# Patient Record
(demographics unavailable — no encounter records)

---

## 2024-10-23 NOTE — ASSESSMENT
[FreeTextEntry1] : She understands risk of immunosuppression from steroids.  Do not get covid vaccine for 2 wk.  f/u prn

## 2024-10-23 NOTE — PROCEDURE
[de-identified] : indication: pain   Fluoroscopically Guided, Contrast Enhanced,  Left L4/5 and L5/S1 Facet Injections   After informed consent, she was placed prone on the fluoroscopy table. Skin over the area was prepped and draped in the usual sterile fashion before being anesthetized with 1% Lidocaine. Then using an oblique approach, a 5  in spinal needle was directed down to the L5/S1 facet joint. Needle placement was confirmed with AP fluoroscopic images. After negative aspiration for blood or cerebrospinal fluid, contrast was instilled under live fluoroscopy and an arthrogram was obtained. It showed good flow in the capsule without any vascular uptake. Then a steroid solution consisting of 20 mg of Kenalog and 1 ml of 1% Lidocaine was instilled. The same procedure was repeated at the    L4/5 facet joint.   She tolerated the procedure well and was discharged in good condition without any complications or complaints.  She  was given discharge instructions and asked to follow-up in clinic in two weeks.     Manufacture [employer] Omnipaque 180 NDC [id number] Exp 12/15/25 [id number]   Lidocaine Manufacture [employer] NDC 65265-626-03 Exp 9/25 LOT [id number]    Triamcinolone NDC [address]-3 Exp 11/25 Lot [id number] Manufacture [employer]

## 2024-10-28 NOTE — HEALTH RISK ASSESSMENT
[Good] : ~his/her~  mood as  good [Yes] : Yes [Monthly or less (1 pt)] : Monthly or less (1 point) [1 or 2 (0 pts)] : 1 or 2 (0 points) [One fall no injury in past year] : Patient reported one fall in the past year without injury [Never] : Never [Alone] : lives alone [Retired] : retired [Fully functional (bathing, dressing, toileting, transferring, walking, feeding)] : Fully functional (bathing, dressing, toileting, transferring, walking, feeding) [Fully functional (using the telephone, shopping, preparing meals, housekeeping, doing laundry, using] : Fully functional and needs no help or supervision to perform IADLs (using the telephone, shopping, preparing meals, housekeeping, doing laundry, using transportation, managing medications and managing finances) [0] : 1) Little interest or pleasure doing things: Not at all (0) [Patient reported mammogram was normal] : Patient reported mammogram was normal [de-identified] : walk [de-identified] : regular [de-identified] : fell in shower, no head strike ,  [MammogramDate] : 05/24

## 2024-10-28 NOTE — HISTORY OF PRESENT ILLNESS
[FreeTextEntry1] : annual exam [de-identified] : Obesity: She reports prednisone has caused her to gain weight. She reports being a petite at 125lb. She became depressed and gained to 150lb. After prednisone she is now 201lb Wants to lose weight and requesting medication  HLD: Simvastatin 40 mg q night   HTN Valsartan 40 mg 3x a day  Metoprolol  2x a day    Vertigo : Takes Meclezine 25 mg at bedtime  Hx Peptic Ulcer/ Chronic GERD:  Pantoprazole 40 mg daily  Aortic stenosis s/p TAVR: Aspirin 81 mg   5/31/24 was in SVT in the 180 bpm range. She was treated with Adenosine 6 mg and 12 mg with conversion to sinus rhythm (performed at her home).  She does report palpitation and SOB on exertion. Cardio discussed ablation as a treatment option. Patient requesting Dr. Erazo's advice.  Sciatica, low back pain:  Epidural every few year   Thyroidectomy: Post surgery tissue in the neck  Admission notable for low TSH 0.211 and high Free T4 2.030. Levothyroxine was reduced to 137 mg from 150 mg. Levothyroxine 137 mg   Bone health:  Alendronic acid 70 mg   Supplements: Folate Vit B12   Labs: Check ESR , TSH   CKD: Sees Adela on thursday   PMR : Prednisone now at 3 mg , no longer having red dots in vision Rheumatology appointment in december HCM - Covid vaccine: 2 weeks ago - Influenza vaccine: 2 weeks ago  -Pneumonia vaccine: UTD - Ophthalmology:  No small red dot in vision today. has to make an appointment  - Dentist: 3 teethes removed ( 1 bridge tooth ) UTD - Derm: Never went because was too busy with other medical appointments. Cheeks are red, fluctuates in intensity. - Diet: regular - Exercise: walks

## 2024-10-28 NOTE — ASSESSMENT
[FreeTextEntry1] : Obesity Management Discuss options for weight management medications. Prednisone will need to be tapered to see weight loss  Aortic Stenosis and Cardiac Management  Discuss the possibility for treatment options (e.g., ablation).  Thyroid Management Labs TSH and Free T4 levels done to check if they have decreased since levothyroxine on 137 mg    PMR: Rheumatology Appointment in December: Follow up on PMR management and current prednisone dosage. ESR lab done  Schedule an ophthalmology appointment to monitor eye health. Follow up with dermatology regarding fluctuating redness on the cheeks and prioritize this visit.

## 2024-10-28 NOTE — END OF VISIT
[FreeTextEntry3] : I personally performed the service described in the documentation, reviewed the documentation recorded by NP student Amparo Hall in my presence and it accurately and completely records my words and actions.

## 2024-10-28 NOTE — REVIEW OF SYSTEMS
[Hearing Loss] : hearing loss [Palpitations] : palpitations [Shortness Of Breath] : shortness of breath [Cough] : cough [Heartburn] : heartburn [Frequency] : frequency [Back Pain] : back pain [Fatigue] : no fatigue [Recent Change In Weight] : ~T no recent weight change [Pain] : no pain [Redness] : no redness [Dryness] : no dryness  [Vision Problems] : no vision problems [Nosebleed] : no nosebleeds [Hoarseness] : no hoarseness [Chest Pain] : no chest pain [Lower Ext Edema] : no lower extremity edema [Wheezing] : no wheezing [Dyspnea on Exertion] : no dyspnea on exertion [Abdominal Pain] : no abdominal pain [Vomiting] : no vomiting [Joint Pain] : no joint pain [Joint Swelling] : no joint swelling [Muscle Pain] : no muscle pain [Mole Changes] : no mole changes [Nail Changes] : no nail changes [Skin Rash] : no skin rash [Headache] : no headache [Dizziness] : no dizziness [Fainting] : no fainting [Confusion] : no confusion [Memory Loss] : no memory loss [Unsteady Walking] : no ataxia [FreeTextEntry3] : no red dots in vision anymore [FreeTextEntry4] : slight hearing loss [FreeTextEntry6] : SOB on exertion

## 2024-10-28 NOTE — PHYSICAL EXAM
[No Acute Distress] : no acute distress [Well Nourished] : well nourished [Well Developed] : well developed [Normal Sclera/Conjunctiva] : normal sclera/conjunctiva [EOMI] : extraocular movements intact [Normal Outer Ear/Nose] : the outer ears and nose were normal in appearance [Normal Oropharynx] : the oropharynx was normal [No JVD] : no jugular venous distention [No Lymphadenopathy] : no lymphadenopathy [Supple] : supple [No Respiratory Distress] : no respiratory distress  [No Accessory Muscle Use] : no accessory muscle use [Clear to Auscultation] : lungs were clear to auscultation bilaterally [Normal Rate] : normal rate  [Regular Rhythm] : with a regular rhythm [Normal S1, S2] : normal S1 and S2 [No Carotid Bruits] : no carotid bruits [Pedal Pulses Present] : the pedal pulses are present [No Edema] : there was no peripheral edema [Soft] : abdomen soft [Non Tender] : non-tender [Non-distended] : non-distended [No Masses] : no abdominal mass palpated [No HSM] : no HSM [Normal Bowel Sounds] : normal bowel sounds [Normal Posterior Cervical Nodes] : no posterior cervical lymphadenopathy [Normal Anterior Cervical Nodes] : no anterior cervical lymphadenopathy [No CVA Tenderness] : no CVA  tenderness [No Spinal Tenderness] : no spinal tenderness [No Joint Swelling] : no joint swelling [No Rash] : no rash [Coordination Grossly Intact] : coordination grossly intact [No Focal Deficits] : no focal deficits [Normal Gait] : normal gait [Normal Affect] : the affect was normal [Normal Insight/Judgement] : insight and judgment were intact [de-identified] : thyroidectomy

## 2024-10-31 NOTE — END OF VISIT
[TextEntry] : All medical record entries have been made by the scribe, JENNIFER CANNON, at Dr. Anil Carty direction and personally dictated by me on 10/31/24. I have received the chart and agree that the record accurately reflects my personal performance of the history, physical exam, assessment, and plan. I have also personally directed, reviewed and agreed with the chart.

## 2024-10-31 NOTE — PHYSICAL EXAM
[General Appearance - Alert] : alert [General Appearance - In No Acute Distress] : in no acute distress [Auscultation Breath Sounds / Voice Sounds] : lungs were clear to auscultation bilaterally [Heart Rate And Rhythm] : heart rate was normal and rhythm regular [Heart Sounds] : normal S1 and S2 [Heart Sounds Gallop] : no gallops [Murmurs] : no murmurs [Heart Sounds Pericardial Friction Rub] : no pericardial rub [Full Pulse] : the pedal pulses are present [Edema] : there was no peripheral edema [Bowel Sounds] : normal bowel sounds [Abdomen Soft] : soft [Abdomen Tenderness] : non-tender [] : no hepato-splenomegaly [Abdomen Mass (___ Cm)] : no abdominal mass palpated [Abnormal Walk] : normal gait [Nail Clubbing] : no clubbing  or cyanosis of the fingernails [Musculoskeletal - Swelling] : no joint swelling seen [Motor Tone] : muscle strength and tone were normal [Oriented To Time, Place, And Person] : oriented to person, place, and time [Impaired Insight] : insight and judgment were intact [Affect] : the affect was normal [FreeTextEntry1] : + AMBULATES WITH CANE

## 2024-10-31 NOTE — PLAN
[TextEntry] : - Encouraged to see Dr. Herzog for routine holter monitor placement q 3mos. EKG completed in clinic.   BP/HR taken in different positions (sitting standing and lying down) and d/w pt Self-monitoring at home advised i.e. BP, FS, etc. Medications updated Diet/healthy lifestyle counselling New labs ordered. Follow up in 2-3 months.

## 2024-10-31 NOTE — HISTORY OF PRESENT ILLNESS
[FreeTextEntry1] : 91 y/o F with PMHX aortic stenosis, bilateral impacted cerumen, BPV, chronic back pain, GERD, contusion of knee, ODE, etc. She is here for a follow up visit. She was last here 08/28/24.  Patient reports feeling well generally.  She is still adherent to her medicinal regimen. Denies changes to medicine. Dr. Hall follows her for her bone density - she takes alendronate 70mg weekly.  Patient reports she is current with her medical obligations.  Admits hx of bone spur on her shoulder and later woke up paralyzed in bed in correlation to polymyalgia rheumatica.  Labs were reviewed with patient.   Patient experienced SVT in May. She is unsure if she would like to proceed with medical therapy and catheter-based ablation as suggested by Dr. Reyna.  She reports experiencing some palpitations occasionally but is unsure why.  Patient's BP accelerates intermittently.

## 2024-11-22 NOTE — PROCEDURE
[de-identified] : indication: pain   Fluoroscopically Guided Left L3, L4, L5 Medial Branch Block   After informed consent, the patient was placed prone on the fluoroscopy table. Skin over the area was prepped and draped in the usual sterile fashion before being anesthetized with 1% Lidocaine. Then using an oblique approach, a 5 in spinal needle was directed down to the junction of the LEFT   lateral superior articular process and the sacral ala. Needle placement was confirmed with AP fluoroscopic images. After negative aspiration for blood or cerebrospinal fluid, 1 ml of  2% Lidocaine was instilled.  The same procedure was repeated by placing the needle at the junction of the LEFT L4 SAP and L5 SAP and their corresponding transverse processes.  Following the procedure, the needles were removed, the skin was cleaned, and sterile dressings were applied.  She tolerated the procedure well and was discharged in good condition without any complications or complaints.  She  felt immediate pain relief.  She  was given discharge instructions and asked to follow-up in clinic in 1 week.   Exp 6/2025 Manufacture: [employer] NDC 93248-208-02 LOT [id number]

## 2024-12-05 NOTE — ASSESSMENT
[FreeTextEntry1] : 91-year-old woman returns for follow up of PMR. Patient with visit longstanding polymyalgia rheumatica previously  doing well on 5 mg a day of prednisone and had tapered to 2 and 1 mg on alternating days. However, given visual symptoms, periodically seeing small red dots with no other visual changes on exam and no accompanying headaches or migraines, increased prednisone to 10 mg qday as per ophthalmology, now continues to taper dose, currently taking 2 mg qday for the past two days.  Of note, patient feels some improvement in the frequency of eye symptoms, episodes less frequent than previous, recently evaluated by neuro ophthalmologist as well. Feels may be triggered by stress as well. Will continue to taper prednisone as tolerated, by 1 mg every 4 weeks as tolerated. Patient continues alendronate 70 mg/week as per endocrinology, DEXA with osteopenia, if able to taper off prednisone, may be able to discontinue alendronate as well.  Patient recently started pulmonary rehab, completed breathing tests, 6 minute walk test, and EKG on Monday, patient will begin pulmonary rehab, twice per week for 4 weeks.  Patient completed blood work with PCP in October, ESR and CRP good. Will continue to taper prednisone and will follow up in 3 months or sooner as needed.

## 2024-12-05 NOTE — HISTORY OF PRESENT ILLNESS
[FreeTextEntry1] : December 5, 2024 Patient returns for a follow up visit of PMR Patient decreased prednisone to 2mg two days ago, reports some aching symptoms in the shoulders but not severe pain and not associated with morning stiffness Followed up with Dr. Carty in August and October, reviewed labs with patient.   Underwent blood work with PCP in October, results reviewed during today's visit, Recent A1c 6.1% Awaiting refills for levothyroxine 137 mcg Patient will be starting pulmonary rehab at [place], had recent breathing test, treadmill breathing test, and EKG on Monday Reports return of red spots in vision intermittently Reports facial rash since taking prednisone but less  Continues walking daily  No headaches, no jaw claudication  September 9, 2024 Patient returns for a follow up visit of PMR Patient overall feeling well. 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 Patient continues prednisone 6 mg daily, did not continue to decrease prednisone as worried about other medical conditions. Reviewed recent labs with patient, patient concerned about weight as well, will continue to decrease prednisone 1 mg every 4 weeks as tolerated. Patient experiences intermittent pain in the hips but does not feel related to PMR symptoms feels more degenerative Denies headaches  The patient visited two neurologists and optometrist re: visual snow syndrome  Patient was seen by pulmonologist, had 6-minute walk test and PFTs Patient followed up with Dr. Carty The patient is walking around her apartment and walking outside for exercise Patient continues alendronate 70 mg weekly reviewed bone density December 2023 with osteopenia, if able to discontinue prednisone would discontinue alendronate as well. The patient will be starting pulmonary rehab  June 5th, 2024 Pt arrives for follow up Since last visit, patient developed tachycardia (180BPM) May 31 2024, was taken to the ER at [place] [facility]. had echo and treated with medications to slow the heart rate Patient feeling better, since that time, was evaluated by endocrinologist, and cardiologist, had medications adjusted.Has follow up with cardiologist tomorrow Pt given magnesium, land decreased levothyroxine dose as well. Takes baby aspirin daily.  In terms of eye symptoms, patient feels as though less symptoms, less episodes of the red dots Pt is on 8mg of prednisolone for two weeks, pt advised to do weekly taper. Will continue to reduce prednisolone by 1mg every 3 weeks.  March 4, 2024 Patient returns for follow up of PMR Patient reports pain in hip for the last week, starting with right and then left, most recently in right hip this morning, will follow with Dr. Mireles Reports redness in cheeks for the last 6 months Reports tiny dots in vision periodically, comes and goes, two weeks ago had it from 3:30pm to night time, started a year and half to two years ago Patient had ocular migraines previously, spots in vision not accompanied by migraines or headaches Patient feels vision has changed, however on exam vision remains the same No jaw pain while chewing Patient noted stye in right eye two days ago, started antibiotic drops tid last night Patient is currently taking alendronate Started on Prednisone 10 mg as per Dr. Barbosa, felt benefit for a few days in eyes symptoms with less frequency of events, currently taking 5 mg qday, will continue for now Patient was evaluated by Dr. Herzog in cardiology, was taking valsartan 40 mg bid, switched to every 8 hours Previously took prednisone 5 or 10 mg years ago after blood pressure was elevated and went to urgent care When initially diagnosed with PMR, patient had pain in foot and shoulder, noted spurs at that time, reports two incidents where patient could not move at all prior to diagnosis Patient was evaluated by nuclear optometry Following with Dr. Barbosa on March 13, and then PCP March 14th Patient concerned about sugars, A1c 5.9 in December Family history of MI (father), Cancer (sisters), hernia (mother), PMR (sister), ROV (cousin) Many years ago had sciatica Reviewed temporal artery ultrasound with patient Evaluated by Dr. Tommie Chi in neuro-ophthalmology Discussed monitoring blood pressure at home, especially when experiences visual symptoms perhaps related to low blood pressure or fluctuations  January 26, 2024 Patient returns for follow up of PMR Patient is feeling generally well Patient reports increasing pain in left knee secondary to DJD (s/p right knee replacement previously) Reports many red "pindrops" in vision periodically, sometimes lasting minutes, also with nuclear migraines, evaluated by Dr. Tommie Chi in neuro-ophthalmology Also reports increased redness in cheeks Blood pressure low in office today (98/62), patient feels may have accidentally taken 2 doses of blood Valsartan 40 mg today, feeling well, encouraged increased hydration and will repeat blood rpessure tonight prior to metoprolol dose and hold if remains lower than usual. Patient is currently taking prednisone 2mg and 1 mg on alternating days since last visit, will taper to 1 mg qday for one month, then decrease to 1 mg on alternating days with plans to discontinue Patient was evaluated by pain management, had epidural with some benefit, received gel injection in hip and reports increase in pain History of right TKA No headaches, no jaw pain, Labs reviewed  September 27, 2023 Patient returns for follow up of PMR Patient currently taking prednisone 2 mg daily, continued tapering since last visit from 4 mg daily Patient overall feeling well, has some slight discomfort in the upper shoulders but otherwise feels well No headaches, no jaw pain, no vision changes Patient continues alendronate 70 mg weekly, has endocrine follow-up in December 2023. Patient had recent facet injections September 11, has follow-up with [facility]NR as well Feels benefit in terms of low back pain from the injections.  May 9, 2023 Patient with history of PMR Diagnosed in September 2021 Started on prednisone 20 mg qday with good response in terms of joint pain and stiffness Patient describes initial symptoms as feeling paralyzed and unable to get out of bed. Initially started in the right shoulder, then spread to left shoulder and bilateral hips Patient has been tapering prednisone over the past 18 months, most recently tapered to 1 mg qday, but repeat blood test with elevated ESR and CRP. Patient is currently taking prednisone 5 mg qday, repeat ESR and CRP in the normal range. Patient is concerned about side effects of prednisone. Patient will be traveling to [place] in the summer and concerned about possible pain. Discussed slow taper, patient currently asymptomatic. Previous taper every 2 weeks of 1 mg, with increasing inflammatory markers when prednisone decreased to 1 mg. Discussed tapering prednisone 1 mg every 4 weeks, will decrease prednisone to 4 mg. Patient continues alendronate for osteoporosis. Reviewed method of administration with patient, no side effects noted. Patient with labile blood pressure, today well controlled. metoprolol 100 bid valsartan 40 bid

## 2024-12-05 NOTE — PHYSICAL EXAM
[General Appearance - Alert] : alert [General Appearance - In No Acute Distress] : in no acute distress [Oriented To Time, Place, And Person] : oriented to person, place, and time [Impaired Insight] : insight and judgment were intact [Affect] : the affect was normal [Abnormal Walk] : normal gait [Nail Clubbing] : no clubbing  or cyanosis of the fingernails [Musculoskeletal - Swelling] : no joint swelling seen [Motor Tone] : muscle strength and tone were normal [General Appearance - Well Nourished] : well nourished [General Appearance - Well Developed] : well developed [General Appearance - Well-Appearing] : healthy appearing [Sclera] : the sclera and conjunctiva were normal [Examination Of The Oral Cavity] : the lips and gums were normal [Respiration, Rhythm And Depth] : normal respiratory rhythm and effort [Exaggerated Use Of Accessory Muscles For Inspiration] : no accessory muscle use [Edema] : there was no peripheral edema [No Spinal Tenderness] : no spinal tenderness [] : no rash [FreeTextEntry1] : No active synovitis of the upper and lower extremities bilaterally. Mild decrease in ROm of the right knee )s/p TKR 3/2019

## 2024-12-05 NOTE — REVIEW OF SYSTEMS
[Negative] : Heme/Lymph [Eyesight Problems] : eyesight problems [Arthralgias] : arthralgias [Joint Swelling] : no joint swelling [Joint Stiffness] : no joint stiffness [FreeTextEntry3] : intermittent red spots, less frequent

## 2024-12-05 NOTE — HISTORY OF PRESENT ILLNESS
[FreeTextEntry1] : December 5, 2024 Patient returns for a follow up visit of PMR Patient decreased prednisone to 2mg two days ago, reports some aching symptoms in the shoulders but not severe pain and not associated with morning stiffness Followed up with Dr. Carty in August and October, reviewed labs with patient.   Underwent blood work with PCP in October, results reviewed during today's visit, Recent A1c 6.1% Awaiting refills for levothyroxine 137 mcg Patient will be starting pulmonary rehab at [place], had recent breathing test, treadmill breathing test, and EKG on Monday Reports return of red spots in vision intermittently Reports facial rash since taking prednisone but less  Continues walking daily  No headaches, no jaw claudication  September 9, 2024 Patient returns for a follow up visit of PMR Patient overall feeling well. 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 Patient continues prednisone 6 mg daily, did not continue to decrease prednisone as worried about other medical conditions. Reviewed recent labs with patient, patient concerned about weight as well, will continue to decrease prednisone 1 mg every 4 weeks as tolerated. Patient experiences intermittent pain in the hips but does not feel related to PMR symptoms feels more degenerative Denies headaches  The patient visited two neurologists and optometrist re: visual snow syndrome  Patient was seen by pulmonologist, had 6-minute walk test and PFTs Patient followed up with Dr. Carty The patient is walking around her apartment and walking outside for exercise Patient continues alendronate 70 mg weekly reviewed bone density December 2023 with osteopenia, if able to discontinue prednisone would discontinue alendronate as well. The patient will be starting pulmonary rehab  June 5th, 2024 Pt arrives for follow up Since last visit, patient developed tachycardia (180BPM) May 31 2024, was taken to the ER at [facility]. had echo and treated with medications to slow the heart rate Patient feeling better, since that time, was evaluated by endocrinologist, and cardiologist, had medications adjusted.Has follow up with cardiologist tomorrow Pt given magnesium, land decreased levothyroxine dose as well. Takes baby aspirin daily.  In terms of eye symptoms, patient feels as though less symptoms, less episodes of the red dots Pt is on 8mg of prednisolone for two weeks, pt advised to do weekly taper. Will continue to reduce prednisolone by 1mg every 3 weeks.  March 4, 2024 Patient returns for follow up of PMR Patient reports pain in hip for the last week, starting with right and then left, most recently in right hip this morning, will follow with Dr. Mireles Reports redness in cheeks for the last 6 months Reports tiny dots in vision periodically, comes and goes, two weeks ago had it from 3:30pm to night time, started a year and half to two years ago Patient had ocular migraines previously, spots in vision not accompanied by migraines or headaches Patient feels vision has changed, however on exam vision remains the same No jaw pain while chewing Patient noted stye in right eye two days ago, started antibiotic drops tid last night Patient is currently taking alendronate Started on Prednisone 10 mg as per Dr. Barbosa, felt benefit for a few days in eyes symptoms with less frequency of events, currently taking 5 mg qday, will continue for now Patient was evaluated by Dr. Herzog in cardiology, was taking valsartan 40 mg bid, switched to every 8 hours Previously took prednisone 5 or 10 mg years ago after blood pressure was elevated and went to urgent care When initially diagnosed with PMR, patient had pain in foot and shoulder, noted spurs at that time, reports two incidents where patient could not move at all prior to diagnosis Patient was evaluated by nuclear optometry Following with Dr. Barbosa on March 13, and then PCP March 14th Patient concerned about sugars, A1c 5.9 in December Family history of MI (father), Cancer (sisters), hernia (mother), PMR (sister), ROV (cousin) Many years ago had sciatica Reviewed temporal artery ultrasound with patient Evaluated by Dr. Tommie Chi in neuro-ophthalmology Discussed monitoring blood pressure at home, especially when experiences visual symptoms perhaps related to low blood pressure or fluctuations  January 26, 2024 Patient returns for follow up of PMR Patient is feeling generally well Patient reports increasing pain in left knee secondary to DJD (s/p right knee replacement previously) Reports many red "pindrops" in vision periodically, sometimes lasting minutes, also with nuclear migraines, evaluated by Dr. Tommie Chi in neuro-ophthalmology Also reports increased redness in cheeks Blood pressure low in office today (98/62), patient feels may have accidentally taken 2 doses of blood Valsartan 40 mg today, feeling well, encouraged increased hydration and will repeat blood rpessure tonight prior to metoprolol dose and hold if remains lower than usual. Patient is currently taking prednisone 2mg and 1 mg on alternating days since last visit, will taper to 1 mg qday for one month, then decrease to 1 mg on alternating days with plans to discontinue Patient was evaluated by pain management, had epidural with some benefit, received gel injection in hip and reports increase in pain History of right TKA No headaches, no jaw pain, Labs reviewed  September 27, 2023 Patient returns for follow up of PMR Patient currently taking prednisone 2 mg daily, continued tapering since last visit from 4 mg daily Patient overall feeling well, has some slight discomfort in the upper shoulders but otherwise feels well No headaches, no jaw pain, no vision changes Patient continues alendronate 70 mg weekly, has endocrine follow-up in December 2023. Patient had recent facet injections September 11, has follow-up with PMNR as well Feels benefit in terms of low back pain from the injections.  May 9, 2023 Patient with history of PMR Diagnosed in September 2021 Started on prednisone 20 mg qday with good response in terms of joint pain and stiffness Patient describes initial symptoms as feeling paralyzed and unable to get out of bed. Initially started in the right shoulder, then spread to left shoulder and bilateral hips Patient has been tapering prednisone over the past 18 months, most recently tapered to 1 mg qday, but repeat blood test with elevated ESR and CRP. Patient is currently taking prednisone 5 mg qday, repeat ESR and CRP in the normal range. Patient is concerned about side effects of prednisone. Patient will be traveling to [place] in the summer and concerned about possible pain. Discussed slow taper, patient currently asymptomatic. Previous taper every 2 weeks of 1 mg, with increasing inflammatory markers when prednisone decreased to 1 mg. Discussed tapering prednisone 1 mg every 4 weeks, will decrease prednisone to 4 mg. Patient continues alendronate for osteoporosis. Reviewed method of administration with patient, no side effects noted. Patient with labile blood pressure, today well controlled. metoprolol 100 bid valsartan 40 bid

## 2024-12-05 NOTE — ADDENDUM
[FreeTextEntry1] :  I, Heather Casanova, acted solely as a scribe for Dr. Rehana Hall, direction and personally dictated by me on 12/05/2024. I have reviewed the chart and agree that the record accurately reflects my personal performance of the history, physical exam, assessment, and plan. I have also personally directed, reviewed, and agreed with the chart.

## 2024-12-11 NOTE — ASSESSMENT
[FreeTextEntry1] : Labs: WBC 9.68 (), Hgb 13.4, Plts 319 Na 142, K 4.5, Cl 103, HCO3 23, BUN/creat 22/1.10, glucose 101 Tbili 0.4, AST/ALT 19/11, Alk phos 56, TP/Albumin 7.4/4.8  ProBNP:  7/2024:   1035 5/2024:   998 4/2023:   539 10/2020: 337 8/2017:   208 8/2017:   182 2016:      553 2015:      87  HIV negative  RF negative CCP negative Scl-70: negative Centromere: negative KAIDEN 1:80 Maki negative RNP negative  V/Q (6/2024): Impression:  Low probability for pulmonary embolism.  Chest CT (10/2023): IMPRESSION: 1. Since October 23, 2021, unchanged multiple subsolid pulmonary nodules. No new suspicious finding.  Chest CT (12/2024): IMPRESSION: No significant interval change when compared to examination dated 10/23/2021. Persistent innumerable subcentimeter and groundglass micronodules the largest measuring up to 6 and 8 mm within the right upper lobe as described above. There may be a slight centrilobular predilection of some of the nodules and abbreviated differential includes subacute hypersensitivity and respiratory bronchiolitis in the appropriate clinical setting. As per Fleischner society 2017 guidelines, continued interval surveillance up to 5 years from the initial study to confirm stability.  TTE (1/2024): CONCLUSIONS:  1. Normal left and right ventricular size and systolic function.  2. Moderately dilated left atrium.  3. TAVR valve is seen in the aortic position.  4. Mild aortic regurgitation.  5. Moderate mitral regurgitation.  6. Moderate mitral stenosis.  7. Moderate-to-severe tricuspid regurgitation.  8. Pulmonary hypertension present, pulmonary artery systolic pressure is 82 mmHg.  9. No pericardial effusion. 10. Compared to the previous TTE performed on 10/26/2020, Aortic valve gradients are increased; aortic, mitral, tricuspid regurgitation, and pulmonary hypertension are new.  TTE (7/2024): CONCLUSIONS: 1. Normal left ventricular cavity size and wall thickness. 2. Hyperdynamic left ventricular systolic function, LV EF 75%. 3. Normal right ventricular cavity size, wall thickness, and systolic function. 4. Mildly dilated left atrium. 5. s/p 23 mm Yonatan S3 TAVR, PV 2.3 m/s, MG 11 mmHg, LVOT/AV VTI 0.44, SVI 13.4 ml/m2. 6. Mild aortic regurgitation, transvalvular. 7. Moderate mitral stenosis, MG 6.0 mmHg at 77 bpm. 8. Mild-to-moderate mitral regurgitation. 9. Moderate tricuspid regurgitation. 10. No pericardial effusion seen. 11. Pulmonary hypertension, PASP 44 mmHg.  PFTs              2022 2024 FEV1/FVC    78%              82% FEV1            1.52, 104%   1.57, 121% FVC              1.96, 98%     1.91, 107% TLC                3.46, 75%    3.26, 73% RV                 1.50, 66%     1.34, 60% DLCO            10.63, 61%    11.66, 71%  6MWT 8/2024: 204 meters (669 feet); jose SpO2 95%  A/P: 90 yo F h/o AS s/p TAVR (2017), moderate mitral stenosis and regurgitation, GERD with LPR, PMR on Prednisone, pulmonary venous hypertension, HFpEF, and mild bronchiectasis returns to clinic.  Ms. Durham is doing well; she is benefitting from pulmonary rehabilitation. She is controlling her volume status.  I suspect her RVSP is related to WHO Group III pulmonary (venous) hypertension, which is stable. She decided not to pursue the sleep study now.  Her chest CT shows stability of her multi-focal GGOs from 0831-4960; I would like to see her back in 6 months to re-evaluate her symptoms and consider repeat imaging in 2025. If she remains well, we would ideally follow these lung nodules for 5 years, if she remains eligible for evaluation and treatment.  1. Pulmonary hypertension: suspect WHO Group II 2. AS s/p TAVR 3. Moderate MS and MR 4. Multiple ground glass pulmonary nodules 5. Exertional dyspnea 6. Mild bronchiectasis  -appreciate Cardiology evaluation -continue pulmonary rehabilitation -discussed BP control and limiting daily sodium and fluid intake (<2 grams/day; <2L/day) -she did not pursue her home sleep study -Vaccinations: up-to-date with Dr. Clark -Future considerations: repeat chest CT in 2025 -follow-up with me in 6 months

## 2024-12-11 NOTE — HISTORY OF PRESENT ILLNESS
[Never] : never [TextBox_4] : I saw Ms. Concetta Durham in follow-up today. In review, Ms. Durham is a 92 yo  F h/o AS (s/p TAVR 2017), moderate mitral regurgitation and stenosis, pulmonary hypertension (likely WHO Group II), GERD with LPR, PMR (2 years prior), dCHF, and mild bronchiectasis. Ms. Durham was previously cared for by Dr. Willoughby with last visit in 8/2022.   12/2023: Ms. Durham reports feeling well. She reports dyspnea with heavy activity. She ambulates with a cane for support. She performs her own ADLs and iADLs at home. She has been less active over the last 1-2 years due to concerns of catching COVID. She has been slowly weaning down on her Prednisone; she is currently on 1 mg/day.  7/2024: Since our last visit, Ms. Durham was hospitalized for SVT in in 6/2024. Her TTE showed new pulmonary hypertension. She recently saw electrophysiology and was offered ablation for SVT, which she is considering. Today, Ms. Durham reports feeling "ok." She has been started on an ARB since our last visit. She denies syncope, chest pain, paroxysmal nocturnal dyspnea, or edema. She denies coughing. She does not take naps during the day. Her Prednisone has continued to be weaned for her PMR, currently 6 mg/day.  8/2024: Ms. Durham has continued to have some exertional dyspnea with heavy activity. She is living independently and without limitations, though the same activities require more exertion. She denies leg edema, cough, or changes in her weight.  12/2024: Ms. Durham started pulmonary rehabilitation at [facility] recently, which is helping her. She did not pursue the sleep study since she is not sure that she needs it. She denies lower extremity edema.  PMH: AS s/p TAVR 2017 Moderate mitral regurgitation and stenosis Breast hyperplasia GERD with LPR PMR (2 years) HTN HLD Hypothyroidism HFpEF Mild bronchiectasis  FH: Breast cancer [ESS] : 0

## 2024-12-26 NOTE — HISTORY OF PRESENT ILLNESS
[FreeTextEntry1] : NPV-BURN ON ABDOMEN [de-identified] : Concetta Durham 92 y/o F presents for burn on abdomen spilled coffee on her abdomen 2 weeks ago went to urgent care  was given silvadene area is itchy also with red bumps on cheeks  Personal history of skin cancer: NO Family history of skin cancer: NO History of blistering sunburns: NO History of tanning bed use: NO Uses sunscreen regularly: NO

## 2024-12-26 NOTE — ASSESSMENT
[FreeTextEntry1] : #Skin infection #PIH 2/2 burn start mupirocin ointment to erosion daily until resolved start cicaplast balm daily  #Rosacea -chronic, flaring -I have discussed the chronic nature and course of this condition -start metrocream bid to aa  -emollients, sun protection  RTC 1 month

## 2024-12-26 NOTE — PHYSICAL EXAM
[Alert] : alert [Oriented x 3] : ~L oriented x 3 [FreeTextEntry3] : Focused exam: -pink hyperpigmented patch with small superficial erosion on abdomen few pink papules on cheeks

## 2025-01-14 NOTE — REASON FOR VISIT
[Follow - Up] : a follow-up visit [Hypothyroidism] : hypothyroidism [Osteoporosis] : osteoporosis [Weight Management/Obesity] : weight management/obesity [Other___] : [unfilled]

## 2025-01-14 NOTE — HISTORY OF PRESENT ILLNESS
[FreeTextEntry1] : 88 y.o. female who was found to have thyroid nodules during a surveillance for pulmonary nodules on a CT scan of the chest in Jan 2021. 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 She underwent total thyroide[place] [facility] [place]. No cancer was found. She is now on 0.15 mg T4 daily. She is also known to have osteopenia  - bone density on 11/1/21; on alendronate tx. Of note, the patient is on prednisone tx for PMR. 4/25/22. The patient is dong well. She continues on thyroid hormone supplementation (0.125 mg T4 daily) and on alendronate. Bone density 10/22/21 is c/w osteopenia. She has gained 5 lb. 3/13/23. The patient's condition is stable - she has no new complaints. She is currently on 0.125 mg T4 daily (she used to be on 0.15 mg T4 daily). TSH on 3/9/23 was 10.3. HbA1C on the same date was 6.1%. 12/11/23. The patient is doing well - she has no new complaints. She continues on 0.15 mg T4 daily. Thyroid u/sound 7/12/23 - subcentimeter nodule, stable. Last bone density - 12/5/23 - stable. She continues on Alendronate tx. 1/14/25. The patient is doing well although she has gained 6 lb. She is still on 1 mg prednisone daily for PMR. She had an episode of arrhythmia in May and her dose of T4 was reduced to 0.137 mg from 0.15 mg. She continues on alendronate; bone density 12/5/23 - osteoporosis.

## 2025-01-14 NOTE — ASSESSMENT
[FreeTextEntry1] : Hypothyroidism/ S/P total thyroidectomy (no cancer). Osteopenia. PMR Obesity   Lab. tests today. Repeat bone density. Medications refilled. F/U - 3 months.

## 2025-01-28 NOTE — PHYSICAL EXAM
[Alert] : alert [Oriented x 3] : ~L oriented x 3 [FreeTextEntry3] : Focused exam: well healed scar on abdomen telangiectasias on cheeks

## 2025-01-28 NOTE — HISTORY OF PRESENT ILLNESS
[FreeTextEntry1] : RPV-BURN ON ABDOMEN [de-identified] : Concetta Durham 90 y/o F presents follow up for burn on abdomen LV:12/26/2024 -improvements from LV rosacea - using metrocream  previous hx: spilled coffee on her abdomen 2 weeks ago went to urgent care  was given silvadene area is itchy also with red bumps on cheeks  Personal history of skin cancer: NO Family history of skin cancer: NO History of blistering sunburns: NO History of tanning bed use: NO Uses sunscreen regularly: NO

## 2025-01-28 NOTE — ASSESSMENT
[FreeTextEntry1] : #Skin infection- resolved #PIH 2/2 burn stop mupirocin ointment to erosion daily until resolved can continue cicaplast balm daily  #Rosacea -chronic, stable -I have discussed the chronic nature and course of this condition -continue metrocream bid to aa  -emollients, sun protection  RTC PRN

## 2025-01-30 NOTE — PHYSICAL EXAM
[General Appearance - Well Developed] : well developed [Normal Appearance] : normal appearance [Well Groomed] : well groomed [General Appearance - Well Nourished] : well nourished [No Deformities] : no deformities [General Appearance - In No Acute Distress] : no acute distress [Normal Conjunctiva] : the conjunctiva exhibited no abnormalities [Eyelids - No Xanthelasma] : the eyelids demonstrated no xanthelasmas [Respiration, Rhythm And Depth] : normal respiratory rhythm and effort [Exaggerated Use Of Accessory Muscles For Inspiration] : no accessory muscle use [Auscultation Breath Sounds / Voice Sounds] : lungs were clear to auscultation bilaterally [Heart Rate And Rhythm] : heart rate and rhythm were normal [Heart Sounds] : normal S1 and S2 [Arterial Pulses Normal] : the arterial pulses were normal [Edema] : no peripheral edema present [Abdomen Soft] : soft [Abdomen Tenderness] : non-tender [Abdomen Mass (___ Cm)] : no abdominal mass palpated [Abnormal Walk] : normal gait [Gait - Sufficient For Exercise Testing] : the gait was sufficient for exercise testing [Nail Clubbing] : no clubbing of the fingernails [Cyanosis, Localized] : no localized cyanosis [Petechial Hemorrhages (___cm)] : no petechial hemorrhages [Skin Color & Pigmentation] : normal skin color and pigmentation [] : no rash [No Venous Stasis] : no venous stasis [Skin Lesions] : no skin lesions [No Skin Ulcers] : no skin ulcer [No Xanthoma] : no  xanthoma was observed [Oriented To Time, Place, And Person] : oriented to person, place, and time [Affect] : the affect was normal [Mood] : the mood was normal [FreeTextEntry1] : extremely anxious

## 2025-01-30 NOTE — ASSESSMENT
[FreeTextEntry1] : Pul HTN -likely multifactorial. may be d/t MR/MS, however there is a dramatic increase in PAP that has not aligned with valve fx.   referred to pulm   D-dimer 296. PAP down to 44 mm July 2024.  Pt reports occas wheezing, given Flovent.  SVT -will get 2 wk zio today. cont metoprolol   Frequent SVT while on Toprol 200/day  HTN: Reports elevated readings at home SBP ranging 160-190. BP slightly above goal 135-145/ 80 mmHg. - On Valsartan 80mg BID. Given IHSS pathology (stated below), will increase beta blocker to 50 BID. Also takes Valsartan as 40 QID, BP low today. Told to reduce Valsartan. Increase BB to 100 mg BID. Procardia 10 mg PRN. Screeining for a Pheo, this was (-). BP excellent. I suspect BP was up D/T pain and prednisone. Mildly elevated, no changes. 1/30/24 -will increase valsartan 40 to TID and do ABPM. suspect home BPs are falsely elevated. needs a bigger cuff. if ABPM shows any lows on valsartan TID, can cut back to 20mg for middle of the day dose 4/5/24 BP stable on TID dosing   visual sxs -cont rheum and optho f/u, no concern for orthostatic bp after monitor   Aortic Stenosis: s/p TAVR 2017. No SOB with ADL's. Still STAPLETON after several blocks. Likely orthopedic issues which limit her. TTE Oct 2020 hyperdynamic LV, LVH, with cavity obliteration, resting gradient 14 mmHg, increases to 46mmHg with valsalva. No issues with TAVR.  Now on high dose BBs  Mitral Stenosis- c/o SOB/STAPLETON, MVA 1.2 cm2 with PAP of 49 mm Hg, Already on Toprol 50 mg BID. Pt to see Dr Worley. Valve is degenerative not a candidate for MV procedure. She also got a 2nd opinion @ [facility], told the same thing.  - INCREASE Toprol to 50mg BID, somewhat better. Will increase further when she returns from her trip. Now on 100 BID  Dizziness: event monitor, unrevealing. No further work up at this time. EST did not show anything (-) either. s/p fall off of a step ladder, + SDH. Back on ASA. Fell in bathtub.  Chronic STAPLETON- multifactorial, orthopedic issues, deconditioning, age, MR/MS, elevated PAPs. Has IHSS Physiology, continue Toprol 100 BID 4/5/24 -likely multifactorial -will repeat echo   Radiculopathy- Gabapentin given  s/p thyroid surgery- Benign Hurthle cell tumor.  Flu shot HD given 9/17/18,11/6/19, Oct 2020 elsewhere. Had Covid Vaccines  Prediabetes- diet reviewed, labs drawn @ Rheum. Cr 1.46 HLD- Zocor increased to 40  Anxiety- trial of Buspar 3/21/22.

## 2025-01-30 NOTE — REASON FOR VISIT
[FreeTextEntry1] : 88 year old female who is a former smoker with a history of HTN, hyperlipidemia, hypothyroidism, GERD/peptic ulcer disease (NSAID-induced) and chronic diastolic heart failure with aortic stenosis s/p transfemoral TAVR with Yonatan S3 (23mm) on 17. Had follow up with Dr. Worley 17, doing well since surgery, complaining of dizziness. Awaiting event monitor, ordered by Dr. Worley. Labs were normal (CBC, coags, BMP, BNP). EKG normal. Reports it has been improving. Carotid us 2017 without evidence of hemodynamically significant stenosis of either carotid system.  Bothered by hives after starting Plavix, changed to effient .  Hives persist.  It may have been bed bugs.  s/p TAVR, and now doing better.  Stamina is better, mild STAPLETON.  PAP is 54mm Hg post TAVR. also has a lot of orthopedic issues. Had epidural 2018 at [facility], still c/o radicular pain left leg EKG: NSR, normal axis and intervals, no ST-Tw abnormalities. 19 Mult c/o 18: Dizziness since end of .  Worse with getting out of bed, room spinning. 18 repeat echo with Dr MORA was good.  Here c/o pain over temples, will check ESR 19 Fell at home Dec 21st 2018.  + N/V, went to [facility] ER  2 days later, + SDH. Sent to Dr Marcano on 18.  MRI done.  Also needs to have a right TKR in near future.  3/1/19- for 3/26/19 with Dr Schwartz 19  s/p right TKR, multiple breast bxs, B/L lumpectomies on Aug 19th, no CA.  Prediabetic 20 Sister  in , diverticulitis and ? CA.  Chronic STAPLETON.  She also reports a "pulling: at her left shoulder.  No recurrence.  Walks without CP.  Fell in Oct striking face and left eyebrow.  Residual nodule which is painful. 3/8/21 Torn ligamen left wrist(Dr Rand), Right eyebrow pain where she feel on it, bone feels abnormal.  Gets pain when sleeping on right face,.. Has phlegm in throat.  Concerned about the thyroid 21 s/p thyroid surgery complicated by hemorrhage.  Torn ligament right foot.  Glucose 139, Mildly elevated Lipids.  Had the Covid Vaccines 10/28/21 had B/L shoulder pain then progressive pain and weakness.  Finally saw Rheum, Dr Jewel Corona and Alejandra Crisostomo. Dx with PMR and placed on steroids. Pt reports being hyper on steroids and BP is up. 01/10/22: here for BP check. Reports high readings at home (>160). Worsening headaches when BP high. 3/21/22 BP labile, more high than low. Getting anxious/panic attacks. 22 STAPLETON, echo today with MS and PAP of 49 mm Hg 10/12/22 On increased Toprol 50 BID.  Going to [place] Oct 21 to 2022. 23 Has chronic back pain.  Plans a trip to [place] Aug 2023. On Pred for PMR. To see Dr Rehana Hall, saw the NP.. On Pred 2 mg.  .  c/o HA 23 labile BPs, had to go to the ER. 23   Going to [place] for 1 mo. 23 Did not have a good visit, very stressful.  Returned 23  Had a bad HA, BPs were up.  Also has sciatica. 24 having high BPs at home -up to 200/110. feeling well overall. no changes in recent lifestyle. went to rheumatologist 4 days ago and had a low BP -took valsartan 2x that morning. down to 1mg daily of prednisone. pain is well controlled.  24 having visual symptoms. +floaters. followed closely by Dr Barbosa and Dr Hall who have been treating her with prednisone. reports SOB with exertion that is chronic. BP has been stable since taking valsartan TID  24 s/p admission for SVT -aborted with adenosine x2. TTE (24): EF 68%, mod dilated LA; TAVR in place with mild AR, Mod MR / MS, Mod-severe TR. describes feeling of "electricity" going throughout her body. had previously had it in her knee -feels it as an impulse. currently on 8mg of prednisone d/t floaters. weaning slowly. can walk a half a block and has to stop.  24 Sees Rheum, Optho and NeuroOphtho: seeing red spots, they got bigger, saw William Barbosa and Alon.  2nd opinion at [facility]. Referred to vascular. Saw Dr Saenz and carotid OK.   She notices facial erythema.  Denies ETOH. In [facility] for SVT.    Had a sleep study. 25 Has seen multiple MDs.  Spilled coffee on herself. EKG -NSR, PVC, IVCD

## 2025-01-30 NOTE — HISTORY OF PRESENT ILLNESS
[FreeTextEntry1] : [facility]-. Dr. Dave.    Knee- Dr Schwartz Hand- Polatsch TAVR- Dr Worley  Pain- Dr Saldaña @ [facility] Breast- Adin Taylor Tongsen PCP- Norbert Fish Derm- Dr Rand Onc- Dr Boykin Rheum- Dr Jewel [address] Endo- Pastora VALLE- Maribell  EKG: NSR, normal axis and intervals, no ST-Tw abnormalities. 1/25/19, 12/16/20

## 2025-01-30 NOTE — REVIEW OF SYSTEMS
[Headache] : headache [Dyspnea on exertion] : dyspnea during exertion [Joint Pain] : joint pain [Anxiety] : anxiety [Negative] : Heme/Lymph [FreeTextEntry9] : torn ligament right foot

## 2025-02-19 NOTE — HISTORY OF PRESENT ILLNESS
[Post-hospitalization from ___ Hospital] : Post-hospitalization from [unfilled] Hospital [Admitted on: ___] : The patient was admitted on [unfilled] [Discharged on ___] : discharged on [unfilled] [FreeTextEntry2] : h/o low back pain  - started memorial weekend 2009; admitted to [facility] at the time - over the years has had intermittent flares tx w/ epidural injections - last week pain was severe for 3 days, no help w/ conservative treatments - developed epigastric pain went to [facility] for evaluation.  - UA performed told her the UA was cloudy and referred to [facility] ED - in ED CT lumbar scan performed - [facility] followed up and started pt on antibiotic

## 2025-02-19 NOTE — HEALTH RISK ASSESSMENT
[0] : 2) Feeling down, depressed, or hopeless: Not at all (0) [PHQ-2 Negative - No further assessment needed] : PHQ-2 Negative - No further assessment needed [[id number]] : 0 [Never] : Never

## 2025-02-19 NOTE — PHYSICAL EXAM
normal gait and station , no tenderness or deformities present [No Acute Distress] : no acute distress [No Edema] : there was no peripheral edema [Normal] : affect was normal and insight and judgment were intact

## 2025-03-06 NOTE — HISTORY OF PRESENT ILLNESS
[FreeTextEntry1] : HPI- 91F PMHx HTN, hyperlipidemia, hypothyroidism, GERD/peptic ulcer disease (NSAID-induced) and chronic diastolic heart failure with aortic stenosis s/p transfemoral TAVR with Yonatan S3 (23mm) on 8/8/17 referred by Dr Clark for reflux evaluation.   Patient reporting transient complaints of acute onset left sided/epigastric pain. Pointing to underneath her ribcage. Currently denying any pain at today's visit. Noted having a similar sensation years ago, had undergone an EGD, at which time was noted to have a hiatal hernia and gastric ulcers (no report to review at time of today's visit).   Describes having some typical reflux complaints. Has been on Pantoprazole 40 mg PO daily which more recently hasn't been controlling her symptoms entirely. With her more recent pain, she went to a [facility] MD, had some urine collected then (was later told she had a UTI and treated with abxs). Was also advised to go to the ED for further evaluation.   Lastly, feels like she has a "membrane" in her throat after undergoing a surgery on her neck many years ago. Has not limited her from eating or food passing nor experience pain with swallowing. She feels like she notes this constantly.   Reports her bowel habits to be otherwise fairly regular, but during times when feeling anxious, she has corresponding diarrhea but otherwise non-bloody stools. Last colonoscopy more than 10 years ago.   Pending TTE tomorrow, will be seeing Dr Herzog tomorrow.   Remainder of ROS negative.  BW: Hgb 12.5 (1/14/25)   (1/14/25) Cr 1.15  (1/14/25) TB 0.4  (1/14/25) Alk phos 58  (1/14/25) AST 14  (1/14/25) ALT 8  (1/14/25)  TTE (7/19/24): 1. Normal left ventricular cavity size and wall thickness. 2. Hyperdynamic left ventricular systolic function, LV EF 75%. 3. Normal right ventricular cavity size, wall thickness, and systolic function. 4. Mildly dilated left atrium. 5. s/p 23 mm Yonatan S3 TAVR, PV 2.3 m/s, MG 11 mmHg, LVOT/AV VTI 0.44, SVI 13.4 ml/m2. 6. Mild aortic regurgitation, transvalvular. 7. Moderate mitral stenosis, MG 6.0 mmHg at 77 bpm. 8. Mild-to-moderate mitral regurgitation. 9. Moderate tricuspid regurgitation. 10. No pericardial effusion seen. 11. Pulmonary hypertension, PASP 44 mmHg.  PMHx - HTN, hyperlipidemia, hypothyroidism, GERD/peptic ulcer disease (NSAID-induced) and chronic diastolic heart failure with aortic stenosis s/p transfemoral TAVR with Yonatan S3 (23mm) on 8/8/17 PSHx - surgical procedures for aortic valve replacement, and removal of thyroid and parathyroid glands Rx - levothyroxine, pantoprazole 40 mg daily, valsartan, alendronate, D3 1000 oral tablet daily,meclizine 25 mg qHS, simvastatin 20 mg qHS, predniSONE 1 mg oral tablet: 8 tab(s) orally once a day, Aspirin EC 81 mg daily, metoprolol succinate 100 mg oral capsule, extended release: 1 cap(s) orally every 12 hours Supplements/herbs/OTC - denies A/C or NSAIDs? - denies FMHx - sister - pancreatic cancer; sister - multiple cancers (unk type), sister - breast cancer Allergies - tramadol EtOH - denies Smoking -neversmoker Drugs - denies  EGD - years ago, Nicolasa Gumaro, noted to have gastric ulcers, HH Colonoscopy - many yrs ago, no prior polyps Cologuard 4-5 yrs ago, negative

## 2025-03-06 NOTE — REVIEW OF SYSTEMS
[As Noted in HPI] : as noted in HPI [Abdominal Pain] : abdominal pain [Diarrhea] : diarrhea [Heartburn] : heartburn [Negative] : Heme/Lymph [Vomiting] : no vomiting [Constipation] : no constipation [Melena (black stool)] : no melena [Bleeding] : no bleeding [Fecal Incontinence (soiling)] : no fecal incontinence [Bloating (gassiness)] : no bloating

## 2025-03-06 NOTE — ASSESSMENT
[FreeTextEntry1] : 91F PMHx HTN, hyperlipidemia, hypothyroidism, GERD/peptic ulcer disease (NSAID-induced) and chronic diastolic heart failure with aortic stenosis s/p transfemoral TAVR with Yonatan S3 (23mm) on 8/8/17 referred by Dr Clark for reflux evaluation.   #Hiatal hernia #reflux #Epigastric pain  #Globus sensation -Ordered for esophagram to evaluate for presence of hiatal hernia, strictures -With inadequate response to Pantoprazole 40 mg daily, rx for Omeprazole 40 mg PO daily  - Dietary and lifestyle modifications (avoid spicy foods, greasy foods, tomato based foods, caffeine, chocolate, alcohol, avoid late night eating and sit upright for at least 2-3 hrs after eating, avoid large meals) - Sleep with head of bed elevated, sleep on left side -Will hold off on consideration for endoscopic evaluation pending response to Omeprazole and esophagram findings  RTC in 2-3 months

## 2025-03-06 NOTE — PHYSICAL EXAM
[Alert] : alert [Normal Voice/Communication] : normal voice/communication [Healthy Appearing] : healthy appearing [No Acute Distress] : no acute distress [Obese (BMI >= 30)] : obese (BMI >= 30) [Sclera] : the sclera and conjunctiva were normal [Hearing Threshold Finger Rub Not [place]] : hearing was normal [Normal Lips/Gums] : the lips and gums were normal [Normal Appearance] : the appearance of the neck was normal [Oropharynx] : the oropharynx was normal [No Neck Mass] : no neck mass was observed [No Respiratory Distress] : no respiratory distress [No Acc Muscle Use] : no accessory muscle use [Respiration, Rhythm And Depth] : normal respiratory rhythm and effort [Abdomen Tenderness] : non-tender [No Masses] : no abdominal mass palpated [Abdomen Soft] : soft [Cervical Lymph Nodes Enlarged Posterior Bilaterally] : no posterior cervical lymphadenopathy [Axillary Lymph Nodes Enlarged Bilaterally] : no axillary lymphadenopathy [Supraclavicular Lymph Nodes Enlarged Bilaterally] : no supraclavicular lymphadenopathy [No CVA Tenderness] : no CVA  tenderness [No Spinal Tenderness] : no spinal tenderness [Abnormal Walk] : normal gait [No Clubbing, Cyanosis] : no clubbing or cyanosis of the fingernails [No Joint Swelling] : no joint swelling seen [Normal Color / Pigmentation] : normal skin color and pigmentation [Cranial Nerves Intact] : cranial nerves 2-12 were intact [] : no rash [Sensation] : the sensory exam was normal to light touch and pinprick [Motor Exam] : the motor exam was normal [Oriented To Time, Place, And Person] : oriented to person, place, and time

## 2025-03-06 NOTE — HISTORY OF PRESENT ILLNESS
[FreeTextEntry1] : HPI- 91F PMHx HTN, hyperlipidemia, hypothyroidism, GERD/peptic ulcer disease (NSAID-induced) and chronic diastolic heart failure with aortic stenosis s/p transfemoral TAVR with Yonatan S3 (23mm) on 8/8/17 referred by Dr Clark for reflux evaluation.   Patient reporting transient complaints of acute onset left sided/epigastric pain. Pointing to underneath her ribcage. Currently denying any pain at today's visit. Noted having a similar sensation years ago, had undergone an EGD, at which time was noted to have a hiatal hernia and gastric ulcers (no report to review at time of today's visit).   Describes having some typical reflux complaints. Has been on Pantoprazole 40 mg PO daily which more recently hasn't been controlling her symptoms entirely. With her more recent pain, she went to a [facility] MD, had some urine collected then (was later told she had a UTI and treated with abxs). Was also advised to go to the ED for further evaluation.   Lastly, feels like she has a "membrane" in her throat after undergoing a surgery on her neck many years ago. Has not limited her from eating or food passing nor experience pain with swallowing. She feels like she notes this constantly.   Reports her bowel habits to be otherwise fairly regular, but during times when feeling anxious, she has corresponding diarrhea but otherwise non-bloody stools. Last colonoscopy more than 10 years ago.   Pending TTE tomorrow, will be seeing Dr Herzog tomorrow.   Remainder of ROS negative.  BW: Hgb 12.5 (1/14/25)   (1/14/25) Cr 1.15  (1/14/25) TB 0.4  (1/14/25) Alk phos 58  (1/14/25) AST 14  (1/14/25) ALT 8  (1/14/25)  TTE (7/19/24): 1. Normal left ventricular cavity size and wall thickness. 2. Hyperdynamic left ventricular systolic function, LV EF 75%. 3. Normal right ventricular cavity size, wall thickness, and systolic function. 4. Mildly dilated left atrium. 5. s/p 23 mm Yonatan S3 TAVR, PV 2.3 m/s, MG 11 mmHg, LVOT/AV VTI 0.44, SVI 13.4 ml/m2. 6. Mild aortic regurgitation, transvalvular. 7. Moderate mitral stenosis, MG 6.0 mmHg at 77 bpm. 8. Mild-to-moderate mitral regurgitation. 9. Moderate tricuspid regurgitation. 10. No pericardial effusion seen. 11. Pulmonary hypertension, PASP 44 mmHg.  PMHx - HTN, hyperlipidemia, hypothyroidism, GERD/peptic ulcer disease (NSAID-induced) and chronic diastolic heart failure with aortic stenosis s/p transfemoral TAVR with Yonatan S3 (23mm) on 8/8/17 PSHx - surgical procedures for aortic valve replacement, and removal of thyroid and parathyroid glands Rx - levothyroxine, pantoprazole 40 mg daily, valsartan, alendronate, D3 1000 oral tablet daily,meclizine 25 mg qHS, simvastatin 20 mg qHS, predniSONE 1 mg oral tablet: 8 tab(s) orally once a day, Aspirin EC 81 mg daily, metoprolol succinate 100 mg oral capsule, extended release: 1 cap(s) orally every 12 hours Supplements/herbs/OTC - denies A/C or NSAIDs? - denies FMHx - sister - pancreatic cancer; sister - multiple cancers (unk type), sister - breast cancer Allergies - tramadol EtOH - denies Smoking -neversmoker Drugs - denies  EGD - years ago, Nicolasa Gumaro, noted to have gastric ulcers, HH Colonoscopy - many yrs ago, no prior polyps Cologuard 4-5 yrs ago, negative

## 2025-03-06 NOTE — PHYSICAL EXAM
[Alert] : alert [Normal Voice/Communication] : normal voice/communication [Healthy Appearing] : healthy appearing [No Acute Distress] : no acute distress [Obese (BMI >= 30)] : obese (BMI >= 30) [Sclera] : the sclera and conjunctiva were normal [Hearing Threshold Finger Rub Not [place]] : hearing was normal [Normal Lips/Gums] : the lips and gums were normal [Normal Appearance] : the appearance of the neck was normal [Oropharynx] : the oropharynx was normal [No Neck Mass] : no neck mass was observed [No Respiratory Distress] : no respiratory distress [No Acc Muscle Use] : no accessory muscle use [Respiration, Rhythm And Depth] : normal respiratory rhythm and effort [Abdomen Tenderness] : non-tender [No Masses] : no abdominal mass palpated [Abdomen Soft] : soft [Cervical Lymph Nodes Enlarged Posterior Bilaterally] : no posterior cervical lymphadenopathy [Axillary Lymph Nodes Enlarged Bilaterally] : no axillary lymphadenopathy [Supraclavicular Lymph Nodes Enlarged Bilaterally] : no supraclavicular lymphadenopathy [No CVA Tenderness] : no CVA  tenderness [No Spinal Tenderness] : no spinal tenderness [Abnormal Walk] : normal gait [No Clubbing, Cyanosis] : no clubbing or cyanosis of the fingernails [No Joint Swelling] : no joint swelling seen [Normal Color / Pigmentation] : normal skin color and pigmentation [Cranial Nerves Intact] : cranial nerves 2-12 were intact [] : no rash [Sensation] : the sensory exam was normal to light touch and pinprick [Motor Exam] : the motor exam was normal [Oriented To Time, Place, And Person] : oriented to person, place, and time

## 2025-03-18 NOTE — ASSESSMENT
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 Hall), HFpEF, severe AS s/p TF TAVR (23mm S3U) on 8/8/17, and moderate MS (MG 5.97) referred for evaluation of her mitral valve disease.  The patient is clinically stable; NYHA Class II-III.  Dr. Worley independently reviewed her recent ECHO which showed the TAVR valve functioning well with a mean gradient of 28 mmHg with mild to moderate aortic regurgitation.  There is moderate mitral stenosis with a mean gradient of 6 mmHg with mild mitral regurgitation and mild tricuspid regurgitation.  The results were discussed with the patient.  Based on the ECHO findings, the patient's mitral stenosis is moderate in which no intervention is recommended at this time.  Dr. Worley recommends the patient continue close clinical follow up with Dr. Mari for her shortness of breath and continue pulmonary rehab as ordered.  Dr. Worley will further discuss with Dr. Herzog for medical management to continue to decrease the patient's heart rate due to palpitations and history of SVT to attempt to help the shortness of breath.  The plan was discussed with the patient and she verbalized understanding.  All questions answered.

## 2025-03-18 NOTE — PHYSICAL EXAM
[Well Developed] : well developed [No Acute Distress] : no acute distress [Normal S1, S2] : normal S1, S2 [Clear Lung Fields] : clear lung fields [No Respiratory Distress] : no respiratory distress  [Soft] : abdomen soft [Non Tender] : non-tender [Normal Gait] : normal gait [No Edema] : no edema [No Clubbing] : no clubbing [No Rash] : no rash [Moves all extremities] : moves all extremities [Normal Speech] : normal speech [Alert and Oriented] : alert and oriented

## 2025-03-18 NOTE — PLAN
[TextEntry] :  (1) Continue close clinical care with Dr. Mari (2) Discuss medical management with Dr. Herzog  (3) Return to Dr. Reyna if would like further discussion on ablation (4) Return to [facility] as needed (5) axb prior to any dental cleanings or procedures

## 2025-03-18 NOTE — HISTORY OF PRESENT ILLNESS
[FreeTextEntry1] : Referred by Dr. Herzog  Pulm: Dr. Mari Rheum: Dr. Hall Endo: Dr. Carr Nephro: Dr. Carty  91F with PMH of hypothyroidism, low back pain, OA s/p right TKA (2019), hypothyroidism s/p total thyroidectomy due to Hurtle cells ([facility], 2021), sciatica, and spondylolisthesis, GERD/PUD (NSAID induced), HTN, HLD, chronic STAPLETON (multifactorial), mild brochiectasis, pHTN (WHO Group III), SVT (on Toprol), polymyalgia (on steroids managed by Dr. Hall), HFpEF, severe AS s/p TF TAVR (23mm S3U) on 17, and moderate MS (MG 5.97) referred for evaluation of her mitral valve disease.   Last seen in [facility] on 2022, NYHA Class II/III with TTE showing normal functioning aortic THV with MG 18, mild AI, moderate MS, trace MR. FABIAN was discussed to better evaluate her mitral valve, however, in the absence of available transcatheter therapies, conservative management was recommended.   Seeing GI for epigastric pain and globus sensation - esophagram pending.  The patient was brought to the ED on 2024 after experiencing palpitations while at home and called 911. On the field, pt was found to be in SVT to 180s and pt received Adenosine 6mg and 12mg with  of arrhythmia.  Once arriving in the ED the patient maintained in NSR throughout her hospital stay.  The patient had frequent PACs.  The patient saw Dr. Reyna in outpatient follow up for SVT who offered ablation which she did not have.  She underwent ambulatory telemetry  - 24 significant for SR (43-65-95), non sustained SVT (longest 14 beats).   The patient has continued close clinical follow up with Dr. Mari.  She recently started pulmonary rehab at [facility].    TTE 10/26/20 CONCLUSIONS:  1. There is mild concentric left ventricular hypertrophy. There are no regional wall motion abnormalities seen. Hyperdynamic left ventricular systolic function with cavity obliteration resulting in an intra-cavitary gradient of 14.00 mmHg at rest. Gradient increases to 46 mm Hg with Valsalva.  2. The right ventricle is normal in size. Right ventricular systolic function is normal.  3. A transcatheter aortic valve (TAVR) is noted in the aortic position. The peak transaortic gradient is 32.72 mmHg. The mean transaortic gradient is 16.00 mmHg. There is no evidence of aortic regurgitation.  4. The mitral valve is mildly thickened and calcified. The mean transvalvular gradient is 4.00 mmHg at a heart rate of 72 bpm. There is trace mitral regurgitation.  5. There is trace tricuspid regurgitation.  6. No evidence of pulmonary hypertension.  7. There is trace pulmonic regurgitation.  8. No pericardial effusion.  9. The aortic root is normal in size.  TTE 24 CONCLUSIONS:  1. Normal left and right ventricular size and systolic function.  2. Moderately dilated left atrium.  3. TAVR valve is seen in the aortic position. The peak transvalvular velocity is 3.30 m/s, the mean transvalvular gradient is 22.00 mmHg, and the LVOT/AV velocity ratio is 0.40. The peak transaortic gradient is 43.56 mmHg.  4. Mild aortic regurgitation.  5. Moderate mitral regurgitation.   6. Moderate mitral stenosis. (MG 6 at HR 57 bpm).  7. Moderate-to-severe tricuspid regurgitation.  8. Pulmonary hypertension present, pulmonary artery systolic pressure is 82 mmHg.  9. No pericardial effusion. 10. Compared to the previous TTE performed on 10/26/2020, Aortic valve gradients are increased; aortic, mitral, tricuspid regurgitation, and pulmonary hypertension are new.  TTE 3/7/25 CONCLUSIONS: 1.Left ventricular cavity is small. Left ventricular systolic function is normal with an ejection fraction of 67 % by Clark's method of disks. 2.Borderline left ventricular hypertrophy. 3.There is moderate (grade 2) left ventricular diastolic dysfunction. 4.Left atrium is moderately dilated. 5.A Transcatheter deployed (TAVR) valve replacement is present in the aortic position. The prosthetic valve is well seated. Mild-to-moderate intravalvular regurgitation originating centrally. 6.The peak transaortic velocity is 3.35 m/s, peak transaortic gradient is 44.8 mmHg and mean transaortic gradient is 27.7 mmHg with an LVOT/aortic valve VTI ratio of 0.27. The effective orifice area is estimated at 0.62 cm by the continuity equation. 7.Moderate mitral stenosis. The transmitral mean gradient is 5.97 mmHg at a heart rate of 75 bpm. 8.Mild mitral regurgitation. 9.Mild tricuspid regurgitation. 10.Estimated pulmonary artery systolic pressure is 66 mmHg, consistent with moderate-to-severe pulmonary hypertension.  EKG 10/31/24: SR with SA rate 66, , QRS 68, QTc 438  labs 25  WBC 10.85 H/H 12.6/41.3 Plt 270 BUN/Cr .30  ProBNP  24: 751 24:1035  Today, patient reports she continues to feel shortness of breath when outside her home with walking one block.  She complains of palpitations with exertion.  She denies chest pain, shortness of breath at rest, dizziness, syncope, orthopnea, PND, or LE edema.

## 2025-03-18 NOTE — PLAN
[TextEntry] :  (1) Continue close clinical care with Dr. Mari (2) Discuss medical management with Dr. Herzog  (3) Return to Dr. Reyna if would like further discussion on ablation (4) Return to [facility] as needed (5) axb prior to any dental cleanings or procedures

## 2025-03-18 NOTE — END OF VISIT
[FreeTextEntry3] : I, Annamarie Romano, am scribing for Dr. Contreras Worley the following sections: HISTORY OF PRESENT ILLNESS, PAST MEDICAL/FAMILY/SOCIAL HISTORY, REVIEW OF SYSTEMS, VITAL SIGNS, PHYSICAL EXAM AND DISPOSITION.   I personally performed the services described in the documentation and reviewed the documented recorded by the scribe in my presence; it accurately and completely records my words and actions.

## 2025-03-18 NOTE — HISTORY OF PRESENT ILLNESS
[FreeTextEntry1] : Referred by Dr. Herzog  Pulm: Dr. Mari Rheum: Dr. Hall Endo: Dr. Carr Nephro: Dr. Carty  91F with PMH of hypothyroidism, low back pain, OA s/p right TKA (2019), hypothyroidism s/p total thyroidectomy due to Hurtle cells ([facility], 2021), sciatica, and spondylolisthesis, GERD/PUD (NSAID induced), HTN, HLD, chronic STAPLETON (multifactorial), mild brochiectasis, pHTN (WHO Group III), SVT (on Toprol), polymyalgia (on steroids managed by Dr. Hall), HFpEF, severe AS s/p TF TAVR (23mm S3U) on 17, and moderate MS (MG 5.97) referred for evaluation of her mitral valve disease.   Last seen in [facility] on 2022, NYHA Class II/III with TTE showing normal functioning aortic THV with MG 18, mild AI, moderate MS, trace MR. FABIAN was discussed to better evaluate her mitral valve, however, in the absence of available transcatheter therapies, conservative management was recommended.   Seeing GI for epigastric pain and globus sensation - esophagram pending.  The patient was brought to the ED on 2024 after experiencing palpitations while at home and called 911. On the field, pt was found to be in SVT to 180s and pt received Adenosine 6mg and 12mg with  of arrhythmia.  Once arriving in the ED the patient maintained in NSR throughout her hospital stay.  The patient had frequent PACs.  The patient saw Dr. Reyna in outpatient follow up for SVT who offered ablation which she did not have.  She underwent ambulatory telemetry  - 24 significant for SR (43-65-95), non sustained SVT (longest 14 beats).   The patient has continued close clinical follow up with Dr. Mari.  She recently started pulmonary rehab at [facility].    TTE 10/26/20 CONCLUSIONS:  1. There is mild concentric left ventricular hypertrophy. There are no regional wall motion abnormalities seen. Hyperdynamic left ventricular systolic function with cavity obliteration resulting in an intra-cavitary gradient of 14.00 mmHg at rest. Gradient increases to 46 mm Hg with Valsalva.  2. The right ventricle is normal in size. Right ventricular systolic function is normal.  3. A transcatheter aortic valve (TAVR) is noted in the aortic position. The peak transaortic gradient is 32.72 mmHg. The mean transaortic gradient is 16.00 mmHg. There is no evidence of aortic regurgitation.  4. The mitral valve is mildly thickened and calcified. The mean transvalvular gradient is 4.00 mmHg at a heart rate of 72 bpm. There is trace mitral regurgitation.  5. There is trace tricuspid regurgitation.  6. No evidence of pulmonary hypertension.  7. There is trace pulmonic regurgitation.  8. No pericardial effusion.  9. The aortic root is normal in size.  TTE 24 CONCLUSIONS:  1. Normal left and right ventricular size and systolic function.  2. Moderately dilated left atrium.  3. TAVR valve is seen in the aortic position. The peak transvalvular velocity is 3.30 m/s, the mean transvalvular gradient is 22.00 mmHg, and the LVOT/AV velocity ratio is 0.40. The peak transaortic gradient is 43.56 mmHg.  4. Mild aortic regurgitation.  5. Moderate mitral regurgitation.   6. Moderate mitral stenosis. (MG 6 at HR 57 bpm).  7. Moderate-to-severe tricuspid regurgitation.  8. Pulmonary hypertension present, pulmonary artery systolic pressure is 82 mmHg.  9. No pericardial effusion. 10. Compared to the previous TTE performed on 10/26/2020, Aortic valve gradients are increased; aortic, mitral, tricuspid regurgitation, and pulmonary hypertension are new.  TTE 3/7/25 CONCLUSIONS: 1.Left ventricular cavity is small. Left ventricular systolic function is normal with an ejection fraction of 67 % by Clark's method of disks. 2.Borderline left ventricular hypertrophy. 3.There is moderate (grade 2) left ventricular diastolic dysfunction. 4.Left atrium is moderately dilated. 5.A Transcatheter deployed (TAVR) valve replacement is present in the aortic position. The prosthetic valve is well seated. Mild-to-moderate intravalvular regurgitation originating centrally. 6.The peak transaortic velocity is 3.35 m/s, peak transaortic gradient is 44.8 mmHg and mean transaortic gradient is 27.7 mmHg with an LVOT/aortic valve VTI ratio of 0.27. The effective orifice area is estimated at 0.62 cm by the continuity equation. 7.Moderate mitral stenosis. The transmitral mean gradient is 5.97 mmHg at a heart rate of 75 bpm. 8.Mild mitral regurgitation. 9.Mild tricuspid regurgitation. 10.Estimated pulmonary artery systolic pressure is 66 mmHg, consistent with moderate-to-severe pulmonary hypertension.  EKG 10/31/24: SR with SA rate 66, , QRS 68, QTc 438  labs 25  WBC 10.85 H/H 12.6/41.3 Plt 270 BUN/Cr .30  ProBNP  24: 751 24:1035  Today, patient reports she continues to feel shortness of breath when outside her home with walking one block.  She complains of palpitations with exertion.  She denies chest pain, shortness of breath at rest, dizziness, syncope, orthopnea, PND, or LE edema.

## 2025-03-24 NOTE — PHYSICAL EXAM
[General Appearance - Alert] : alert [General Appearance - In No Acute Distress] : in no acute distress [General Appearance - Well Nourished] : well nourished [General Appearance - Well-Appearing] : healthy appearing [General Appearance - Well Developed] : well developed [Sclera] : the sclera and conjunctiva were normal [Respiration, Rhythm And Depth] : normal respiratory rhythm and effort [Exaggerated Use Of Accessory Muscles For Inspiration] : no accessory muscle use [Auscultation Breath Sounds / Voice Sounds] : lungs were clear to auscultation bilaterally [Abnormal Walk] : normal gait [Nail Clubbing] : no clubbing  or cyanosis of the fingernails [Musculoskeletal - Swelling] : no joint swelling seen [Motor Tone] : muscle strength and tone were normal [] : no rash [Oriented To Time, Place, And Person] : oriented to person, place, and time [Impaired Insight] : insight and judgment were intact [Affect] : the affect was normal [Mood] : the mood was normal [FreeTextEntry1] : No active synovitis of the upper and lower extremities bilaterally.  Ambulates with cane

## 2025-03-24 NOTE — HISTORY OF PRESENT ILLNESS
[FreeTextEntry1] : March 24, 2025 Patient returns for follow up of PMR, patient overall feeling well Since last visit, patient reports spilling hot coffee on chest wall and left hand, now healed Completed pulmonary rehab at [facility], does not feel helping too much with shortness of breath with ambulation.  Usually can walk about one block and then rests and feels better Had follow up 3/17 for mitral valve with surgeon, will see cardiologist 2/2025 to discuss possible medication changes 1/2025 ESR 42 Prednisone 1 mg qday Eye spots every so often but not as often, ocular migraines, resolve on own, sees different shapes and dots Will see Dr. Alejo for follow up  April 23 Walking ok, no falls Continues alendronate on Sundays, patient reports possible implants, would recommend discussing with oral surgeon and endocrinologist given chronic alendronate us Feels pain in the lumbar spine while standing and walking, gets better with rest  December 5, 2024 Patient returns for a follow up visit of PMR Patient decreased prednisone to 2mg two days ago, reports some aching symptoms in the shoulders but not severe pain and not associated with morning stiffness Followed up with Dr. Carty in August and October, reviewed labs with patient.   Underwent blood work with PCP in October, results reviewed during today's visit, Recent A1c 6.1% Awaiting refills for levothyroxine 137 mcg Patient will be starting pulmonary rehab at [place], had recent breathing test, treadmill breathing test, and EKG on Monday Reports return of red spots in vision intermittently Reports facial rash since taking prednisone but less  Continues walking daily  No headaches, no jaw claudication  September 9, 2024 Patient returns for a follow up visit of PMR Patient overall feeling well. 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 Patient continues prednisone 6 mg daily, did not continue to decrease prednisone as worried about other medical conditions. Reviewed recent labs with patient, patient concerned about weight as well, will continue to decrease prednisone 1 mg every 4 weeks as tolerated. Patient experiences intermittent pain in the hips but does not feel related to PMR symptoms feels more degenerative Denies headaches  The patient visited two neurologists and optometrist re: visual snow syndrome  Patient was seen by pulmonologist, had 6-minute walk test and PFTs Patient followed up with Dr. Carty The patient is walking around her apartment and walking outside for exercise Patient continues alendronate 70 mg weekly reviewed bone density December 2023 with osteopenia, if able to discontinue prednisone would discontinue alendronate as well. The patient will be starting pulmonary rehab  June 5th, 2024 Pt arrives for follow up Since last visit, patient developed tachycardia (180BPM) May 31 2024, was taken to the ER at [place]. had echo and treated with medications to slow the heart rate Patient feeling better, since that time, was evaluated by endocrinologist, and cardiologist, had medications adjusted.Has follow up with cardiologist tomorrow Pt given magnesium, land decreased levothyroxine dose as well. Takes baby aspirin daily.  In terms of eye symptoms, patient feels as though less symptoms, less episodes of the red dots Pt is on 8mg of prednisolone for two weeks, pt advised to do weekly taper. Will continue to reduce prednisolone by 1mg every 3 weeks.  March 4, 2024 Patient returns for follow up of PMR Patient reports pain in hip for the last week, starting with right and then left, most recently in right hip this morning, will follow with Dr. Mireles Reports redness in cheeks for the last 6 months Reports tiny dots in vision periodically, comes and goes, two weeks ago had it from 3:30pm to night time, started a year and half to two years ago Patient had ocular migraines previously, spots in vision not accompanied by migraines or headaches Patient feels vision has changed, however on exam vision remains the same No jaw pain while chewing Patient noted stye in right eye two days ago, started antibiotic drops tid last night Patient is currently taking alendronate Started on Prednisone 10 mg as per Dr. Barbosa, felt benefit for a few days in eyes symptoms with less frequency of events, currently taking 5 mg qday, will continue for now Patient was evaluated by Dr. Herzog in cardiology, was taking valsartan 40 mg bid, switched to every 8 hours Previously took prednisone 5 or 10 mg years ago after blood pressure was elevated and went to urgent care When initially diagnosed with PMR, patient had pain in foot and shoulder, noted spurs at that time, reports two incidents where patient could not move at all prior to diagnosis Patient was evaluated by nuclear optometry Following with Dr. Barbosa on March 13, and then PCP March 14th Patient concerned about sugars, A1c 5.9 in December Family history of MI (father), Cancer (sisters), hernia (mother), PMR (sister), ROV (cousin) Many years ago had sciatica Reviewed temporal artery ultrasound with patient Evaluated by Dr. Tommie Chi in neuro-ophthalmology Discussed monitoring blood pressure at home, especially when experiences visual symptoms perhaps related to low blood pressure or fluctuations  January 26, 2024 Patient returns for follow up of PMR Patient is feeling generally well Patient reports increasing pain in left knee secondary to DJD (s/p right knee replacement previously) Reports many red "pindrops" in vision periodically, sometimes lasting minutes, also with nuclear migraines, evaluated by Dr. Tommie Chi in neuro-ophthalmology Also reports increased redness in cheeks Blood pressure low in office today (98/62), patient feels may have accidentally taken 2 doses of blood Valsartan 40 mg today, feeling well, encouraged increased hydration and will repeat blood rpessure tonight prior to metoprolol dose and hold if remains lower than usual. Patient is currently taking prednisone 2mg and 1 mg on alternating days since last visit, will taper to 1 mg qday for one month, then decrease to 1 mg on alternating days with plans to discontinue Patient was evaluated by pain management, had epidural with some benefit, received gel injection in hip and reports increase in pain History of right TKA No headaches, no jaw pain, Labs reviewed  September 27, 2023 Patient returns for follow up of PMR Patient currently taking prednisone 2 mg daily, continued tapering since last visit from 4 mg daily Patient overall feeling well, has some slight discomfort in the upper shoulders but otherwise feels well No headaches, no jaw pain, no vision changes Patient continues alendronate 70 mg weekly, has endocrine follow-up in December 2023. Patient had recent facet injections September 11, has follow-up with PMNR as well Feels benefit in terms of low back pain from the injections.  May 9, 2023 Patient with history of PMR Diagnosed in September 2021 Started on prednisone 20 mg qday with good response in terms of joint pain and stiffness Patient describes initial symptoms as feeling paralyzed and unable to get out of bed. Initially started in the right shoulder, then spread to left shoulder and bilateral hips Patient has been tapering prednisone over the past 18 months, most recently tapered to 1 mg qday, but repeat blood test with elevated ESR and CRP. Patient is currently taking prednisone 5 mg qday, repeat ESR and CRP in the normal range. Patient is concerned about side effects of prednisone. Patient will be traveling to [place] in the summer and concerned about possible pain. Discussed slow taper, patient currently asymptomatic. Previous taper every 2 weeks of 1 mg, with increasing inflammatory markers when prednisone decreased to 1 mg. Discussed tapering prednisone 1 mg every 4 weeks, will decrease prednisone to 4 mg. Patient continues alendronate for osteoporosis. Reviewed method of administration with patient, no side effects noted. Patient with labile blood pressure, today well controlled. metoprolol 100 bid valsartan 40 bid

## 2025-03-24 NOTE — ASSESSMENT
[FreeTextEntry1] : 91-year-old woman returns for follow up of PMR. Patient with visit longstanding polymyalgia rheumatica previously  doing well on 5 mg a day of prednisone and had tapered to 2 and 1 mg on alternating days, now continues prednisone 1 mg qday. Since last visit, patient feels some improvement in the frequency of eye symptoms, episodes less frequent than previous, has follow up with ophthalmologist next month, likely secondary to ocular migraines and feels may be triggered by stress as well. History of osteoporosis, patient continues alendronate 70 mg/week as per endocrinology, patient considering implant, will discuss with endocrinologist given alendronate use. Patient recently completed pulmonary rehab, unclear benefit but continues ambulation for exercise and had recent follow up with cardiac surgeon as well, will see cardiologist next month as well.  Will update ESR and CRP today in office.  Will follow up in 3 months or sooner as needed.

## 2025-04-01 NOTE — REASON FOR VISIT
[FreeTextEntry1] : 88 year old female who is a former smoker with a history of HTN, hyperlipidemia, hypothyroidism, GERD/peptic ulcer disease (NSAID-induced) and chronic diastolic heart failure with aortic stenosis s/p transfemoral TAVR with Yonatan S3 (23mm) on 17. Had follow up with Dr. Worley 17, doing well since surgery, complaining of dizziness. Awaiting event monitor, ordered by Dr. Worley. Labs were normal (CBC, coags, BMP, BNP). EKG normal. Reports it has been improving. Carotid us 2017 without evidence of hemodynamically significant stenosis of either carotid system.  Bothered by hives after starting Plavix, changed to effient .  Hives persist.  It may have been bed bugs.  s/p TAVR, and now doing better.  Stamina is better, mild STAPLETON.  PAP is 54mm Hg post TAVR. also has a lot of orthopedic issues. Had epidural 2018 at [facility], still c/o radicular pain left leg EKG: NSR, normal axis and intervals, no ST-Tw abnormalities. 19 Mult c/o 18: Dizziness since end of .  Worse with getting out of bed, room spinning. 18 repeat echo with Dr MORA was good.  Here c/o pain over temples, will check ESR 19 Fell at home Dec 21st 2018.  + N/V, went to [facility] ER  2 days later, + SDH. Sent to Dr Marcano on 18.  MRI done.  Also needs to have a right TKR in near future.  3/1/19- for 3/26/19 with Dr Schwartz 19  s/p right TKR, multiple breast bxs, B/L lumpectomies on Aug 19th, no CA.  Prediabetic 20 Sister  in , diverticulitis and ? CA.  Chronic STAPLETON.  She also reports a "pulling: at her left shoulder.  No recurrence.  Walks without CP.  Fell in Oct striking face and left eyebrow.  Residual nodule which is painful. 3/8/21 Torn ligamen left wrist(Dr Rand), Right eyebrow pain where she feel on it, bone feels abnormal.  Gets pain when sleeping on right face,.. Has phlegm in throat.  Concerned about the thyroid 21 s/p thyroid surgery complicated by hemorrhage.  Torn ligament right foot.  Glucose 139, Mildly elevated Lipids.  Had the Covid Vaccines 10/28/21 had B/L shoulder pain then progressive pain and weakness.  Finally saw Rheum, Dr Jewel Corona and Alejandra Crisostomo. Dx with PMR and placed on steroids. Pt reports being hyper on steroids and BP is up. 01/10/22: here for BP check. Reports high readings at home (>160). Worsening headaches when BP high. 3/21/22 BP labile, more high than low. Getting anxious/panic attacks. 22 STAPLETON, echo today with MS and PAP of 49 mm Hg 10/12/22 On increased Toprol 50 BID.  Going to [place] Oct 21 to 2022. 23 Has chronic back pain.  Plans a trip to [place] Aug 2023. On Pred for PMR. To see Dr Rehana Hall, saw the NP.. On Pred 2 mg.  .  c/o HA 23 labile BPs, had to go to the ER. 25 bothered by ocular migraines and HAs.  BP at home 200 systolic and 170 at orthopedics.  23   Going to [place] for 1 mo. 23 Did not have a good visit, very stressful.  Returned 23  Had a bad HA, BPs were up.  Also has sciatica. 24 having high BPs at home -up to 200/110. feeling well overall. no changes in recent lifestyle. went to rheumatologist 4 days ago and had a low BP -took valsartan 2x that morning. down to 1mg daily of prednisone. pain is well controlled.  24 having visual symptoms. +floaters. followed closely by Dr Barbosa and Dr Hall who have been treating her with prednisone. reports SOB with exertion that is chronic. BP has been stable since taking valsartan TID  24 s/p admission for SVT -aborted with adenosine x2. TTE (24): EF 68%, mod dilated LA; TAVR in place with mild AR, Mod MR / MS, Mod-severe TR. describes feeling of "electricity" going throughout her body. had previously had it in her knee -feels it as an impulse. currently on 8mg of prednisone d/t floaters. weaning slowly. can walk a half a block and has to stop.  24 Sees Rheum, Optho and NeuroOphtho: seeing red spots, they got bigger, saw William Barbosa and Alon.  2nd opinion at [facility]. Referred to vascular. Saw Dr Saenz and carotid OK.   She notices facial erythema.  Denies ETOH. In [facility] for SVT.    Had a sleep study. 25 Has seen multiple MDs.  Spilled coffee on herself. EKG -NSR, PVC, IVCD

## 2025-04-01 NOTE — HISTORY OF PRESENT ILLNESS
[FreeTextEntry1] : [facility]-. Dr. Dave.    Knee- Dr Schwartz Hand- Polatsch TAVR- Dr Worley  Pain- Dr Saldaña @ [facility] Breast- Adin Taylor Tongsen PCP- Norbert Fish Derm- Dr Rand Onc- Dr Boykin Rheum- Dr Jewel [address] Endo- Pastora VALLE- Maribell  EKG: NSR, normal axis and intervals, no ST-Tw abnormalities. 1/25/19, 12/16/20

## 2025-04-01 NOTE — ASSESSMENT
[FreeTextEntry1] : Pul HTN -likely multifactorial. may be d/t MR/MS, however there is a dramatic increase in PAP that has not aligned with valve fx.   referred to pulm   D-dimer 296. PAP down to 44 mm July 2024.  Pt reports occas wheezing, given Flovent.  SVT -will get 2 wk zio today. cont metoprolol   Frequent SVT while on Toprol 200/day  HTN: Reports elevated readings at home SBP ranging 160-190. BP slightly above goal 135-145/ 80 mmHg. - On Valsartan 80mg BID. Given IHSS pathology (stated below), will increase beta blocker to 50 BID. Also takes Valsartan as 40 QID, BP low today. Told to reduce Valsartan. Increase BB to 100 mg BID. Procardia 10 mg PRN. Screeining for a Pheo, this was (-). BP excellent. I suspect BP was up D/T pain and prednisone. Mildly elevated, no changes. 1/30/24 -will increase valsartan 40 to TID and do ABPM. suspect home BPs are falsely elevated. needs a bigger cuff. if ABPM shows any lows on valsartan TID, can cut back to 20mg for middle of the day dose 4/5/24 BP stable on TID dosing   visual sxs -cont rheum and optho f/u, no concern for orthostatic bp after monitor   Aortic Stenosis: s/p TAVR 2017. No SOB with ADL's. Still STAPLETON after several blocks. Likely orthopedic issues which limit her. TTE Oct 2020 hyperdynamic LV, LVH, with cavity obliteration, resting gradient 14 mmHg, increases to 46mmHg with valsalva. No issues with TAVR.  Now on high dose BBs  Mitral Stenosis- c/o SOB/STAPLETON, MVA 1.2 cm2 with PAP of 49 mm Hg, Already on Toprol 50 mg BID. Pt to see Dr Worley. Valve is degenerative not a candidate for MV procedure. She also got a 2nd opinion @ [facility], told the same thing.  - INCREASE Toprol to 50mg BID, somewhat better. Will increase further when she returns from her trip. Now on 100 BID.  Adding 25 mg more.  Dizziness: event monitor, unrevealing. No further work up at this time. EST did not show anything (-) either. s/p fall off of a step ladder, + SDH. Back on ASA. Fell in bathtub.  Chronic STAPLETON- multifactorial, orthopedic issues, deconditioning, age, MR/MS, elevated PAPs. Has IHSS Physiology, continue Toprol 100 BID 4/5/24 -likely multifactorial -will repeat echo   Radiculopathy- Gabapentin given  s/p thyroid surgery- Benign Hurthle cell tumor.  Flu shot HD given 9/17/18,11/6/19, Oct 2020 elsewhere. Had Covid Vaccines  Prediabetes- diet reviewed, labs drawn @ Rheum. Cr 1.46 HLD- Zocor increased to 40  Anxiety- trial of Buspar 3/21/22.

## 2025-04-02 NOTE — HISTORY OF PRESENT ILLNESS
[de-identified] :  Ms. Durham is a 91 year old woman who comes in for evaluation for LEFT foot pain that started years ago She has history of 3 prior foot surgeries. One was by Dr. Alford and 2 by Dr. Baumann.  She had a replacement of the hallux MP joint and then had 2 surgeries on her second toe.  1 was a shortening procedure and the other 1 was a fusion of the PIP.  She states that it does not hurt during the day but when she lies down at night she gets pain.  Sometimes the tip of the second toe is swollen or red.  She also gets pain in the lateral hindfoot area.  She stopped wearing sneakers and is wearing his shoe that is very nonsupportive. I had seen her a few years ago and diagnosed her with polymyalgia rheumatica.  She was on a prednisone taper over the last couple years and is down to 1 mg. Her pain is a 3-8 out of 10.  She has not seen anyone for this.  No injections have been done around the ankle.

## 2025-04-02 NOTE — HISTORY OF PRESENT ILLNESS
[de-identified] :  Ms. Durham is a 91 year old woman who comes in for evaluation for LEFT foot pain that started years ago She has history of 3 prior foot surgeries. One was by Dr. Alford and 2 by Dr. Baumann.  She had a replacement of the hallux MP joint and then had 2 surgeries on her second toe.  1 was a shortening procedure and the other 1 was a fusion of the PIP.  She states that it does not hurt during the day but when she lies down at night she gets pain.  Sometimes the tip of the second toe is swollen or red.  She also gets pain in the lateral hindfoot area.  She stopped wearing sneakers and is wearing his shoe that is very nonsupportive. I had seen her a few years ago and diagnosed her with polymyalgia rheumatica.  She was on a prednisone taper over the last couple years and is down to 1 mg. Her pain is a 3-8 out of 10.  She has not seen anyone for this.  No injections have been done around the ankle.

## 2025-04-02 NOTE — ASSESSMENT
[FreeTextEntry1] : 91-year-old with underlying hyperpronation/pes planus he was had surgery on her left hallux MP joint and second toe with a PIP fusion and possible metatarsal shortening in the past that gets pain in the second toe.  The PIP fusion healed with hyperextension.  It is good that it does not hurt when just walking. She does have pain in the sinus Tarsi possibly related to the planovalgus deformity and impingement in the sinus Tarsi/lateral ankle and inflammation and possibly some arthritis of the subtalar joint.  I offered to try steroid injection today which she did want to try. Will see if this gives her relief.  I also suggested wearing a sneaker with a better arch support to see if this helps.  Heat and ice as needed.  Tylenol if needed. Follow-up in about a month

## 2025-04-02 NOTE — PROCEDURE
[Injection] : Injection [Left] : on the left.   [Inflammation] : Inflammation [Patient] : patient [Risk] : Risk [Benefits] : benefits [Alternatives] : alternatives [Bleeding] : bleeding [Infection] : infection [Allergic Reaction] : allergic reaction [Verbal Consent Obtained] : verbal consent was obtained prior to the procedure [Alcohol] : Alcohol [Ethyl Chloride Spray] : ethyl chloride spray was used as a topical anesthetic [Direct] : direct [1% Lidocaine___(mL)] : [unfilled] mL of 1% Lidocaine [Triamcinolone 40mg/mL___(mL)] : [unfilled] ~UmL of 40mg/mL triamcinolone [Bandage Applied] : a bandage [Tolerated Well] : The patient tolerated the procedure well [None] : None [No Strenuous Activity___day(s)] : avoid strenuous activity for [unfilled] day(s) [Lateral] : lateral [25] : a 25-gauge [de-identified] : subtalar joint/sinus Tarsi [de-identified] : chloraprep

## 2025-04-02 NOTE — HISTORY OF PRESENT ILLNESS
[de-identified] :  Ms. Durham is a 91 year old woman who comes in for evaluation for LEFT foot pain that started years ago She has history of 3 prior foot surgeries. One was by Dr. Alford and 2 by Dr. Baumann.  She had a replacement of the hallux MP joint and then had 2 surgeries on her second toe.  1 was a shortening procedure and the other 1 was a fusion of the PIP.  She states that it does not hurt during the day but when she lies down at night she gets pain.  Sometimes the tip of the second toe is swollen or red.  She also gets pain in the lateral hindfoot area.  She stopped wearing sneakers and is wearing his shoe that is very nonsupportive. I had seen her a few years ago and diagnosed her with polymyalgia rheumatica.  She was on a prednisone taper over the last couple years and is down to 1 mg. Her pain is a 3-8 out of 10.  She has not seen anyone for this.  No injections have been done around the ankle.

## 2025-04-02 NOTE — PHYSICAL EXAM
[LE] : Sensory: Intact in bilateral lower extremities [Normal RLE] : Right Lower Extremity: No scars, rashes, lesions, ulcers, skin intact [Normal LLE] : Left Lower Extremity: No scars, rashes, lesions, ulcers, skin intact [Normal Touch] : sensation intact for touch [Normal] : Oriented to person, place, and time, insight and judgement were intact and the affect was normal [Slightly Antalgic] : slightly antalgic [de-identified] : Left foot and ankle Well-healed incision second toe with hyperextension of the PIP joint and rigid PIP and DIP joint second toe.  Mild medial deviation of the second toe. Hallux rigidus. Mild tenderness at the second MP joint and first MP joint. Toes are warm with normal capillary refill. Very tender in the sinus Tarsi/lateral subtalar joint region of the left ankle. Decreased subtalar motion. Intact anterior tibial tendon, gastrocsoleus, peroneals, posterior tibial tendon. Sensation is intact. Hyperpronation. She is wearing a very flat nonsupportive shoe today which she states feels comfortable to her [de-identified] :  X-rays ordered, performed and reviewed today of LEFT foot and ankle for pain shows intact mortise.  There may be narrowing or arthritis of the subtalar joint on the lateral view left ankle. There is evidence of a replacement of the first MP joint.  There is fusion of the second toe PIP joint.  Medial deviation of the second and third toes.  Pes planus

## 2025-04-02 NOTE — PHYSICAL EXAM
[LE] : Sensory: Intact in bilateral lower extremities [Normal RLE] : Right Lower Extremity: No scars, rashes, lesions, ulcers, skin intact [Normal LLE] : Left Lower Extremity: No scars, rashes, lesions, ulcers, skin intact [Normal Touch] : sensation intact for touch [Normal] : Oriented to person, place, and time, insight and judgement were intact and the affect was normal [Slightly Antalgic] : slightly antalgic [de-identified] : Left foot and ankle Well-healed incision second toe with hyperextension of the PIP joint and rigid PIP and DIP joint second toe.  Mild medial deviation of the second toe. Hallux rigidus. Mild tenderness at the second MP joint and first MP joint. Toes are warm with normal capillary refill. Very tender in the sinus Tarsi/lateral subtalar joint region of the left ankle. Decreased subtalar motion. Intact anterior tibial tendon, gastrocsoleus, peroneals, posterior tibial tendon. Sensation is intact. Hyperpronation. She is wearing a very flat nonsupportive shoe today which she states feels comfortable to her [de-identified] :  X-rays ordered, performed and reviewed today of LEFT foot and ankle for pain shows intact mortise.  There may be narrowing or arthritis of the subtalar joint on the lateral view left ankle. There is evidence of a replacement of the first MP joint.  There is fusion of the second toe PIP joint.  Medial deviation of the second and third toes.  Pes planus

## 2025-04-02 NOTE — PROCEDURE
[Injection] : Injection [Left] : on the left.   [Inflammation] : Inflammation [Patient] : patient [Risk] : Risk [Benefits] : benefits [Alternatives] : alternatives [Bleeding] : bleeding [Infection] : infection [Allergic Reaction] : allergic reaction [Verbal Consent Obtained] : verbal consent was obtained prior to the procedure [Alcohol] : Alcohol [Ethyl Chloride Spray] : ethyl chloride spray was used as a topical anesthetic [Direct] : direct [1% Lidocaine___(mL)] : [unfilled] mL of 1% Lidocaine [Triamcinolone 40mg/mL___(mL)] : [unfilled] ~UmL of 40mg/mL triamcinolone [Bandage Applied] : a bandage [Tolerated Well] : The patient tolerated the procedure well [None] : None [No Strenuous Activity___day(s)] : avoid strenuous activity for [unfilled] day(s) [Lateral] : lateral [25] : a 25-gauge [de-identified] : subtalar joint/sinus Tarsi [de-identified] : chloraprep

## 2025-04-02 NOTE — PHYSICAL EXAM
[LE] : Sensory: Intact in bilateral lower extremities [Normal RLE] : Right Lower Extremity: No scars, rashes, lesions, ulcers, skin intact [Normal LLE] : Left Lower Extremity: No scars, rashes, lesions, ulcers, skin intact [Normal Touch] : sensation intact for touch [Normal] : Oriented to person, place, and time, insight and judgement were intact and the affect was normal [Slightly Antalgic] : slightly antalgic [de-identified] : Left foot and ankle Well-healed incision second toe with hyperextension of the PIP joint and rigid PIP and DIP joint second toe.  Mild medial deviation of the second toe. Hallux rigidus. Mild tenderness at the second MP joint and first MP joint. Toes are warm with normal capillary refill. Very tender in the sinus Tarsi/lateral subtalar joint region of the left ankle. Decreased subtalar motion. Intact anterior tibial tendon, gastrocsoleus, peroneals, posterior tibial tendon. Sensation is intact. Hyperpronation. She is wearing a very flat nonsupportive shoe today which she states feels comfortable to her [de-identified] :  X-rays ordered, performed and reviewed today of LEFT foot and ankle for pain shows intact mortise.  There may be narrowing or arthritis of the subtalar joint on the lateral view left ankle. There is evidence of a replacement of the first MP joint.  There is fusion of the second toe PIP joint.  Medial deviation of the second and third toes.  Pes planus

## 2025-04-02 NOTE — PROCEDURE
[Injection] : Injection [Left] : on the left.   [Inflammation] : Inflammation [Patient] : patient [Risk] : Risk [Benefits] : benefits [Alternatives] : alternatives [Bleeding] : bleeding [Infection] : infection [Allergic Reaction] : allergic reaction [Verbal Consent Obtained] : verbal consent was obtained prior to the procedure [Alcohol] : Alcohol [Ethyl Chloride Spray] : ethyl chloride spray was used as a topical anesthetic [Direct] : direct [1% Lidocaine___(mL)] : [unfilled] mL of 1% Lidocaine [Triamcinolone 40mg/mL___(mL)] : [unfilled] ~UmL of 40mg/mL triamcinolone [Bandage Applied] : a bandage [Tolerated Well] : The patient tolerated the procedure well [None] : None [No Strenuous Activity___day(s)] : avoid strenuous activity for [unfilled] day(s) [Lateral] : lateral [25] : a 25-gauge [de-identified] : subtalar joint/sinus Tarsi [de-identified] : chloraprep

## 2025-04-10 NOTE — ASSESSMENT
[FreeTextEntry1] : HTN: Reports elevated readings at home SBP ranging 160-190. BP slightly above goal 135-145/ 80 mmHg. - On Valsartan 80mg BID. Given IHSS pathology (stated below), will increase beta blocker to 50 BID. Also takes Valsartan as 40 QID, BP low today. Told to reduce Valsartan. Increase BB to 100 mg BID. Procardia 10 mg PRN. Screeining for a Pheo, this was (-). BP excellent. I suspect BP was up D/T pain and prednisone. Mildly elevated, no changes. 1/30/24 -will increase valsartan 40 to TID and do ABPM. suspect home BPs are falsely elevated. needs a bigger cuff. if ABPM shows any lows on valsartan TID, can cut back to 20mg for middle of the day dose 4/5/24 BP stable on TID dosing 4/10/25 bp low today. asymptomatic. will stop amlodipine 2.5mg. current plan is valsartan 80mg BID and metop 125 in am, 100 in pm. follow up closely. stop checking bps at home. refer to neuro if headaches persist on stable regimen   Pul HTN -likely multifactorial. may be d/t MR/MS, however there is a dramatic increase in PAP that has not aligned with valve fx.   referred to pulm   D-dimer 296. PAP down to 44 mm July 2024.  Pt reports occas wheezing, given Flovent.  SVT -will get 2 wk zio today. cont metoprolol   Frequent SVT while on Toprol 200/day   visual sxs -cont rheum and optho f/u, no concern for orthostatic bp after monitor   Aortic Stenosis: s/p TAVR 2017. No SOB with ADL's. Still STAPLETON after several blocks. Likely orthopedic issues which limit her. TTE Oct 2020 hyperdynamic LV, LVH, with cavity obliteration, resting gradient 14 mmHg, increases to 46mmHg with valsalva. No issues with TAVR.  Now on high dose BBs  Mitral Stenosis- c/o SOB/STAPLETON, MVA 1.2 cm2 with PAP of 49 mm Hg, Already on Toprol 50 mg BID. Pt to see Dr Worley. Valve is degenerative not a candidate for MV procedure. She also got a 2nd opinion @ [facility], told the same thing.  - INCREASE Toprol to 50mg BID, somewhat better. Will increase further when she returns from her trip. Now on 100 BID.  Adding 25 mg more.  Dizziness: event monitor, unrevealing. No further work up at this time. EST did not show anything (-) either. s/p fall off of a step ladder, + SDH. Back on ASA. Fell in bathtub.  Chronic STAPLETON- multifactorial, orthopedic issues, deconditioning, age, MR/MS, elevated PAPs. Has IHSS Physiology, continue Toprol 100 BID 4/5/24 -likely multifactorial -will repeat echo   Radiculopathy- Gabapentin given  s/p thyroid surgery- Benign Hurthle cell tumor.  Flu shot HD given 9/17/18,11/6/19, Oct 2020 elsewhere. Had Covid Vaccines  Prediabetes- diet reviewed, labs drawn @ Rheum. Cr 1.46 HLD- Zocor increased to 40  Anxiety- trial of Buspar 3/21/22.

## 2025-04-10 NOTE — ASSESSMENT
[FreeTextEntry1] : HTN: Reports elevated readings at home SBP ranging 160-190. BP slightly above goal 135-145/ 80 mmHg. - On Valsartan 80mg BID. Given IHSS pathology (stated below), will increase beta blocker to 50 BID. Also takes Valsartan as 40 QID, BP low today. Told to reduce Valsartan. Increase BB to 100 mg BID. Procardia 10 mg PRN. Screeining for a Pheo, this was (-). BP excellent. I suspect BP was up D/T pain and prednisone. Mildly elevated, no changes. 1/30/24 -will increase valsartan 40 to TID and do ABPM. suspect home BPs are falsely elevated. needs a bigger cuff. if ABPM shows any lows on valsartan TID, can cut back to 20mg for middle of the day dose 4/5/24 BP stable on TID dosing 4/10/25 bp low today. asymptomatic. will stop amlodipine 2.5mg. current plan is valsartan 80mg BID and metop 125 in am, 100 in pm. follow up closely. stop checking bps at home. refer to neuro if headaches persist on stable regimen   Pul HTN -likely multifactorial. may be d/t MR/MS, however there is a dramatic increase in PAP that has not aligned with valve fx.   referred to pulm   D-dimer 296. PAP down to 44 mm July 2024.  Pt reports occas wheezing, given Flovent.  SVT -will get 2 wk zio today. cont metoprolol   Frequent SVT while on Toprol 200/day   visual sxs -cont rheum and optho f/u, no concern for orthostatic bp after monitor   Aortic Stenosis: s/p TAVR 2017. No SOB with ADL's. Still STAPLETON after several blocks. Likely orthopedic issues which limit her. TTE Oct 2020 hyperdynamic LV, LVH, with cavity obliteration, resting gradient 14 mmHg, increases to 46mmHg with valsalva. No issues with TAVR.  Now on high dose BBs  Mitral Stenosis- c/o SOB/STAPLETON, MVA 1.2 cm2 with PAP of 49 mm Hg, Already on Toprol 50 mg BID. Pt to see Dr Worley. Valve is degenerative not a candidate for MV procedure. She also got a 2nd opinion @ [facility], told the same thing.  - INCREASE Toprol to 50mg BID, somewhat better. Will increase further when she returns from her trip. Now on 100 BID.  Adding 25 mg more.  Dizziness: event monitor, unrevealing. No further work up at this time. EST did not show anything (-) either. s/p fall off of a step ladder, + SDH. Back on ASA. Fell in bathtub.  Chronic STAPLETON- multifactorial, orthopedic issues, deconditioning, age, MR/MS, elevated PAPs. Has IHSS Physiology, continue Toprol 100 BID 4/5/24 -likely multifactorial -will repeat echo   Radiculopathy- Gabapentin given  s/p thyroid surgery- Benign Hurthle cell tumor.  Flu shot HD given 9/17/18,11/6/19, Oct 2020 elsewhere. Had Covid Vaccines  Prediabetes- diet reviewed, labs drawn @ Rheum. Cr 1.46 HLD- Zocor increased to 40  Anxiety- trial of Buspar 3/21/22.

## 2025-04-10 NOTE — REASON FOR VISIT
[FreeTextEntry1] : 88 year old female who is a former smoker with a history of HTN, hyperlipidemia, hypothyroidism, GERD/peptic ulcer disease (NSAID-induced) and chronic diastolic heart failure with aortic stenosis s/p transfemoral TAVR with Yonatan S3 (23mm) on 17. Had follow up with Dr. Worley 17, doing well since surgery, complaining of dizziness. Awaiting event monitor, ordered by Dr. Worley. Labs were normal (CBC, coags, BMP, BNP). EKG normal. Reports it has been improving. Carotid us 2017 without evidence of hemodynamically significant stenosis of either carotid system.  Bothered by hives after starting Plavix, changed to effient .  Hives persist.  It may have been bed bugs.  s/p TAVR, and now doing better.  Stamina is better, mild STAPLETON.  PAP is 54mm Hg post TAVR. also has a lot of orthopedic issues. Had epidural 2018 at [facility], still c/o radicular pain left leg EKG: NSR, normal axis and intervals, no ST-Tw abnormalities. 19 Mult c/o 18: Dizziness since end of .  Worse with getting out of bed, room spinning. 18 repeat echo with Dr MORA was good.  Here c/o pain over temples, will check ESR 19 Fell at home Dec 21st 2018.  + N/V, went to [facility] ER  2 days later, + SDH. Sent to Dr Marcano on 18.  MRI done.  Also needs to have a right TKR in near future.  3/1/19- for 3/26/19 with Dr Schwartz 19  s/p right TKR, multiple breast bxs, B/L lumpectomies on Aug 19th, no CA.  Prediabetic 20 Sister  in , diverticulitis and ? CA.  Chronic STAPLETON.  She also reports a "pulling: at her left shoulder.  No recurrence.  Walks without CP.  Fell in Oct striking face and left eyebrow.  Residual nodule which is painful. 3/8/21 Torn ligamen left wrist(Dr Rand), Right eyebrow pain where she feel on it, bone feels abnormal.  Gets pain when sleeping on right face,.. Has phlegm in throat.  Concerned about the thyroid 21 s/p thyroid surgery complicated by hemorrhage.  Torn ligament right foot.  Glucose 139, Mildly elevated Lipids.  Had the Covid Vaccines 10/28/21 had B/L shoulder pain then progressive pain and weakness.  Finally saw Rheum, Dr Jewel Corona and Alejandra Crisostomo. Dx with PMR and placed on steroids. Pt reports being hyper on steroids and BP is up. 01/10/22: here for BP check. Reports high readings at home (>160). Worsening headaches when BP high. 3/21/22 BP labile, more high than low. Getting anxious/panic attacks. 22 STAPLETON, echo today with MS and PAP of 49 mm Hg 10/12/22 On increased Toprol 50 BID.  Going to [place] Oct 21 to 2022. 23 Has chronic back pain.  Plans a trip to [place] Aug 2023. On Pred for PMR. To see Dr Rehana Hall, saw the NP.. On Pred 2 mg.  .  c/o HA 23 labile BPs, had to go to the ER. 25 bothered by ocular migraines and HAs.  BP at home 200 systolic and 170 at orthopedics.  23   Going to [place] for 1 mo. 23 Did not have a good visit, very stressful.  Returned 23  Had a bad HA, BPs were up.  Also has sciatica. 24 having high BPs at home -up to 200/110. feeling well overall. no changes in recent lifestyle. went to rheumatologist 4 days ago and had a low BP -took valsartan 2x that morning. down to 1mg daily of prednisone. pain is well controlled.  24 having visual symptoms. +floaters. followed closely by Dr Barbosa and Dr Hall who have been treating her with prednisone. reports SOB with exertion that is chronic. BP has been stable since taking valsartan TID  24 s/p admission for SVT -aborted with adenosine x2. TTE (24): EF 68%, mod dilated LA; TAVR in place with mild AR, Mod MR / MS, Mod-severe TR. describes feeling of "electricity" going throughout her body. had previously had it in her knee -feels it as an impulse. currently on 8mg of prednisone d/t floaters. weaning slowly. can walk a half a block and has to stop.  24 Sees Rheum, Optho and NeuroOphtho: seeing red spots, they got bigger, saw William Barbosa and Alon.  2nd opinion at [facility]. Referred to vascular. Saw Dr Saenz and carotid OK.   She notices facial erythema.  Denies ETOH. In [facility] for SVT.    Had a sleep study. 25 Has seen multiple MDs.  Spilled coffee on herself. 25 bp has been severely elevated. having severe headaches. bp now low. current regimen is metoprolol 125mg in am and 100mg in pm, valsartan 80mg BID. amlodipine 2.5mg / amlodipine was added by the ED. She is checking her bp frequently at home   EKG -NSR, PVC, IVCD

## 2025-04-10 NOTE — REASON FOR VISIT
[FreeTextEntry1] : 88 year old female who is a former smoker with a history of HTN, hyperlipidemia, hypothyroidism, GERD/peptic ulcer disease (NSAID-induced) and chronic diastolic heart failure with aortic stenosis s/p transfemoral TAVR with Yonatan S3 (23mm) on 17. Had follow up with Dr. Worley 17, doing well since surgery, complaining of dizziness. Awaiting event monitor, ordered by Dr. Worley. Labs were normal (CBC, coags, BMP, BNP). EKG normal. Reports it has been improving. Carotid us 2017 without evidence of hemodynamically significant stenosis of either carotid system.  Bothered by hives after starting Plavix, changed to effient .  Hives persist.  It may have been bed bugs.  s/p TAVR, and now doing better.  Stamina is better, mild STAPELTON.  PAP is 54mm Hg post TAVR. also has a lot of orthopedic issues. Had epidural 2018 at [facility], still c/o radicular pain left leg EKG: NSR, normal axis and intervals, no ST-Tw abnormalities. 19 Mult c/o 18: Dizziness since end of .  Worse with getting out of bed, room spinning. 18 repeat echo with Dr MORA was good.  Here c/o pain over temples, will check ESR 19 Fell at home Dec 21st 2018.  + N/V, went to [facility] ER  2 days later, + SDH. Sent to Dr Marcano on 18.  MRI done.  Also needs to have a right TKR in near future.  3/1/19- for 3/26/19 with Dr Schwartz 19  s/p right TKR, multiple breast bxs, B/L lumpectomies on Aug 19th, no CA.  Prediabetic 20 Sister  in , diverticulitis and ? CA.  Chronic STAPLETON.  She also reports a "pulling: at her left shoulder.  No recurrence.  Walks without CP.  Fell in Oct striking face and left eyebrow.  Residual nodule which is painful. 3/8/21 Torn ligamen left wrist(Dr Rand), Right eyebrow pain where she feel on it, bone feels abnormal.  Gets pain when sleeping on right face,.. Has phlegm in throat.  Concerned about the thyroid 21 s/p thyroid surgery complicated by hemorrhage.  Torn ligament right foot.  Glucose 139, Mildly elevated Lipids.  Had the Covid Vaccines 10/28/21 had B/L shoulder pain then progressive pain and weakness.  Finally saw Rheum, Dr Jewel Corona and Alejandra Crisostomo. Dx with PMR and placed on steroids. Pt reports being hyper on steroids and BP is up. 01/10/22: here for BP check. Reports high readings at home (>160). Worsening headaches when BP high. 3/21/22 BP labile, more high than low. Getting anxious/panic attacks. 22 STAPLETON, echo today with MS and PAP of 49 mm Hg 10/12/22 On increased Toprol 50 BID.  Going to [place] Oct 21 to 2022. 23 Has chronic back pain.  Plans a trip to [place] Aug 2023. On Pred for PMR. To see Dr Rehana Hall, saw the NP.. On Pred 2 mg.  .  c/o HA 23 labile BPs, had to go to the ER. 25 bothered by ocular migraines and HAs.  BP at home 200 systolic and 170 at orthopedics.  23   Going to [place] for 1 mo. 23 Did not have a good visit, very stressful.  Returned 23  Had a bad HA, BPs were up.  Also has sciatica. 24 having high BPs at home -up to 200/110. feeling well overall. no changes in recent lifestyle. went to rheumatologist 4 days ago and had a low BP -took valsartan 2x that morning. down to 1mg daily of prednisone. pain is well controlled.  24 having visual symptoms. +floaters. followed closely by Dr Barbosa and Dr Hall who have been treating her with prednisone. reports SOB with exertion that is chronic. BP has been stable since taking valsartan TID  24 s/p admission for SVT -aborted with adenosine x2. TTE (24): EF 68%, mod dilated LA; TAVR in place with mild AR, Mod MR / MS, Mod-severe TR. describes feeling of "electricity" going throughout her body. had previously had it in her knee -feels it as an impulse. currently on 8mg of prednisone d/t floaters. weaning slowly. can walk a half a block and has to stop.  24 Sees Rheum, Optho and NeuroOphtho: seeing red spots, they got bigger, saw William Barbosa and Alon.  2nd opinion at [facility]. Referred to vascular. Saw Dr Saenz and carotid OK.   She notices facial erythema.  Denies ETOH. In [facility] for SVT.    Had a sleep study. 25 Has seen multiple MDs.  Spilled coffee on herself. 25 bp has been severely elevated. having severe headaches. bp now low. current regimen is metoprolol 125mg in am and 100mg in pm, valsartan 80mg BID. amlodipine 2.5mg / amlodipine was added by the ED. She is checking her bp frequently at home   EKG -NSR, PVC, IVCD

## 2025-04-10 NOTE — HISTORY OF PRESENT ILLNESS
Patient : Nils Prescott Age: 13 year old Sex: male   MRN: [id number] Encounter Date: 10/7/2024      History     Chief Complaint   Patient presents with    Fever    Cough     HPI per patient and patient's mother at bedside  HPI  Nils Prescott is a 13 year old male who presents with fever and cough x3 days. Patient's mother reports patient's temperature at home being 104F-107F.  Patient was seen at urgent care yesterday, however his fever increased today so he was brought to ED today for further evaluation. Patient he has not had any sore throat, ear pain, neck pain, difficulty swallowing, shortness of breath, abdominal pain, nausea, vomiting, diarrhea, urinary symptoms, rash, headache, or any other symptoms.  Patient's mother reports recently testing positive for COVID.        I have reviewed Nils Prescott's previous urgent care note from 10/06/2024 .    Note Review Summary:   SUBJECTIVE:   13 year old male, who presents with a complaint of fever, cough, and congestion with known Covid (+) contact (mother) and he tested negative for Covid on day 1 of his symptoms.   Mother is present during this visit.   Patient states he had fever, cough beginning Friday. He has had congestion and runny nose. Fevers were persistently high Tmax 102-103 F.   ASSESSMENT:  1. Viral URI with cough    2. Fever, unspecified fever cause    3. Acute cough    PLAN:  Etiology is likely viral, and supportive home management is recommended only at this time. Patient was instructed to continue either Tylenol every 4-6 hours or ibuprofen every 6-8 hours as needed for fevers. Will have him take ibuprofen 400 mg every 6 hours for fevers.  Continue to push fluids  Start tessalon perles as needed for cough  Albuterol inhaler as needed for shortness of breath/wheezing  Follow-up with PCP in 3-5 days for re-evaluation if having worsening symptoms.      Allergies   Allergen Reactions    Seasonal Runny Nose       Discharge Medication List as of 10/7/2024  9:07 AM         Prior to Admission Medications    Details   benzonatate (TESSALON PERLES) 100 MG capsule Take 1 capsule by mouth 3 times daily as needed for Cough. May increase dose to 2cap TID if cough/throat irritation is severe.Eprescribe, Disp-60 capsule, R-0      albuterol 108 (90 Base) MCG/ACT inhaler Inhale 2 puffs into the lungs every 4 hours as needed for Shortness of Breath or Wheezing.Eprescribe, Disp-1 each, R-0      escitalopram (LEXAPRO) 5 MG/5ML solution Take 15 mLs by mouth daily.Eprescribe, Disp-480 mL, R-1      Spacer/Aero-Holding Chambers (AeroChamber MV) Misc Use with all HFADisp-1 each, R-0, Eprescribe      diphenhydrAMINE (BENADRYL) 25 MG capsule Take 25 mg by mouth every 6 hours as needed for Itching.Historical Med      IBUPROFEN PO Historical Med           New Prescriptions    Details   amoxicillin (AMOXIL) 500 MG capsule Take 2 tablets by mouth in the morning and 2 tablets in the evening. Do all this for 10 days.Disp-40 tablet, R-0, Eprescribe             No past medical history on file.    No past surgical history on file.    Family History   Problem Relation Age of Onset    Irritable Bowel Syndrome Mother     Other Mother         Lactose Intolerance    Allergic Rhinitis Mother     Depression Father     Anxiety disorder Father     Obsesive Compulsive Disorder Father     Substance Abuse Father     Crohn's Disease Maternal Uncle     Hypertension Paternal Grandmother        Social History     Tobacco Use    Smoking status: Never     Passive exposure: Never    Smokeless tobacco: Never   Vaping Use    Vaping status: never used       E-cigarette/Vaping    E-Cigarette/Vaping Use Never Used      E-Cigarette/Vaping Substances & Devices       Review of Systems  Review of systems otherwise unremarkable other than that described in the HPI.      Physical Exam     ED Triage Vitals   ED Triage Vitals Group      Temp 10/07/24 0728 (!) 102.1 °F (38.9 °C)      Heart Rate 10/07/24 0728 (!) 153      Resp 10/07/24 0904 20       BP 10/07/24 0728 122/61      SpO2 10/07/24 0728 96 %      EtCO2 mmHg --       Height 10/07/24 0728 5' 6\" (1.676 m)      Weight 10/07/24 0728 118 lb 2.7 oz (53.6 kg)      Weight Scale Used 10/07/24 0728 Scale in bed      BMI (Calculated) 10/07/24 0728 19.07      IBW/kg (Calculated) 10/07/24 0728 63.8       Physical Exam  Vitals and nursing note reviewed.   Constitutional:       General: He is not in acute distress.     Appearance: Normal appearance. He is not ill-appearing, toxic-appearing or diaphoretic.   HENT:      Head: Normocephalic and atraumatic.      Right Ear: Tympanic membrane, ear canal and external ear normal.      Left Ear: Tympanic membrane, ear canal and external ear normal.      Nose: Nose normal.      Mouth/Throat:      Mouth: Mucous membranes are moist.      Pharynx: Oropharynx is clear. No oropharyngeal exudate or posterior oropharyngeal erythema.   Eyes:      General:         Right eye: No discharge.         Left eye: No discharge.      Extraocular Movements: Extraocular movements intact.      Conjunctiva/sclera: Conjunctivae normal.      Pupils: Pupils are equal, round, and reactive to light.   Cardiovascular:      Rate and Rhythm: Normal rate and regular rhythm.      Pulses: Normal pulses.      Heart sounds: Normal heart sounds. No murmur heard.     No friction rub. No gallop.   Pulmonary:      Effort: Pulmonary effort is normal. No respiratory distress.      Breath sounds: Normal breath sounds. No wheezing, rhonchi or rales.   Chest:      Chest wall: No tenderness.   Abdominal:      General: Abdomen is flat. Bowel sounds are normal. There is no distension.      Palpations: Abdomen is soft.      Tenderness: There is no abdominal tenderness. There is no right CVA tenderness, left CVA tenderness, guarding or rebound.   Musculoskeletal:         General: No tenderness or deformity. Normal range of motion.      Cervical back: Normal range of motion and neck supple. No rigidity or tenderness.      [FreeTextEntry1] : [facility]-. Dr. Dave.    Knee- Dr Schwartz Hand- Polatsch TAVR- Dr Worley  Pain- Dr Saldaña @ [facility] Breast- Adin Taylor Tongsen PCP- Norbert Fish Derm- Dr Rand Onc- Dr Boykin Rheum- Dr Jewel [address] Endo- Pastora VALLE- Maribell  EKG: NSR, normal axis and intervals, no ST-Tw abnormalities. 1/25/19, 12/16/20  Right lower leg: No edema.      Left lower leg: No edema.   Lymphadenopathy:      Cervical: No cervical adenopathy.   Skin:     General: Skin is warm and dry.      Capillary Refill: Capillary refill takes less than 2 seconds.      Findings: No rash.   Neurological:      Mental Status: He is alert and oriented to person, place, and time.      Cranial Nerves: No cranial nerve deficit, dysarthria or facial asymmetry.      Sensory: Sensation is intact. No sensory deficit.      Motor: Motor function is intact. No weakness.      Coordination: Coordination is intact.   Psychiatric:         Behavior: Behavior is cooperative.         ED Course     Procedures    Lab Results     Results for orders placed or performed during the hospital encounter of 10/07/24   COVID/Flu/RSV panel    Specimen: Nasal, Mid-turbinate; Swab   Result Value Ref Range    Rapid SARS-COV-2 by PCR Not Detected Not Detected / Detected / Presumptive Positive / Inhibitors present    Influenza A by PCR Not Detected Not Detected    Influenza B by PCR Not Detected Not Detected    RSV BY PCR Not Detected Not Detected    Isolation Guidelines      Procedural Comment     Streptococcus group A PCR    Specimen: Throat; Swab   Result Value Ref Range    STREPTOCOCCUS GROUP A PCR Detected (A) Not Detected       Radiology Results     Imaging Results              XR CHEST AP OR PA (Final result)  Result time 10/07/24 08:25:14      Final result                   Impression:    IMPRESSION: Left lower lobe pneumonia. Follow-up as clinically indicated.    Electronically Signed by: Manuel Sebastian M.D.  Signed on: 10/7/2024 8:25 AM  Created on Workstation ID: [id number]  Signed on Workstation ID: [id number]               Narrative:    Chest, two-view    CLINICAL HISTORY: Cough and fever    COMPARISON: PA chest with abdominal series 2/14/2023. Chest radiographs  1/4/2023.    FINDINGS: Frontal and lateral upright chest radiograph show a focal  airspace opacity in the medial left  lower lobe. Elsewhere the lungs are  clear. There is no pleural effusion. The heart, pulmonary vessels and  mediastinal contours are normal.                                      ED Medication Orders (From admission, onward)      Ordered Start     Status Ordering Provider    10/07/24 0841 10/07/24 0842  acetaminophen (TYLENOL) tablet 650 mg  ONCE         Last MAR action: Given GAYATHRI TEJEDA    10/07/24 0832 10/07/24 0833  amoxicillin (AMOXIL) capsule 500 mg  ONCE         Last MAR action: Given GAYATHRI TEJEDA          Vitals:    10/07/24 0728 10/07/24 0849 10/07/24 0904   BP: 122/61 105/61 100/61   BP Location: RUE - Right upper extremity     Patient Position: Semi-John's     Pulse: (!) 153 (!) 143 (!) 129   Resp:   20   Temp: (!) 102.1 °F (38.9 °C)     TempSrc: Oral     SpO2: 96% 99% 97%   Weight: 53.6 kg (118 lb 2.7 oz)     Height: 5' 6\" (1.676 m)              Medical Decision Making      13 year old male who presents with fever and cough x3 days. PEx per above.     COVID negative. Influenza negative. RSV negative.   Strep PCR positive.  Chest XRay consistent with Left lower lobe pneumonia.   Pt Tx in ED with Amoxicillin and Tylenol.    Patient reassessed - Patient well-appearing and vital signs reassuring. Discussed with Patient's mother at bedside ED findings and plan for discharge. Rx Amoxicillin which will cover both patient's pneumonia and strep pharyngitis. Patient's mother was given ED warnings, discharge instructions, and follow up information to go home with. Patient's mother understands and agrees with plan for discharge. Any questions have been answered. Patient discharged in good condition.        Clinical Impression and Diagnosis         ED Diagnoses       Diagnosis Comment Associated Orders       Final diagnoses    Streptococcal pharyngitis -- --    Pneumonia of left lower lobe due to infectious organism -- --            Follow Up:  Cookie Biswas MD  [address] [place] [address]  [id number]  [phone]    Schedule an appointment as soon as possible for a visit in 3 days  for an ER follow up visit          Summary of your Discharge Medications        Take these Medications        Details   amoxicillin 500 MG capsule  Commonly known as: AMOXIL   Take 2 capsules by mouth in the morning and 2 capsules in the evening. Do all this for 10 days.              Pt is discharged to home/self care in stable condition.             Marcel Hall, DO  10/08/24 0809

## 2025-04-10 NOTE — HISTORY OF PRESENT ILLNESS
[FreeTextEntry1] : [facility] Dr. Dave.    Knee- Dr Schwartz Hand- Polatsch TAVR- Dr Worley  Pain- Dr Saldaña @ [facility] Breast- Adin Taylor Tongsen PCP- Norbert Fish Derm- Dr Rand Onc- Dr Boykin Rheum- Dr Jewel [address] Endo- Pastora VALLE- Maribell  EKG: NSR, normal axis and intervals, no ST-Tw abnormalities. 1/25/19, 12/16/20

## 2025-04-18 NOTE — REASON FOR VISIT
No [FreeTextEntry1] : 88 year old female who is a former smoker with a history of HTN, hyperlipidemia, hypothyroidism, GERD/peptic ulcer disease (NSAID-induced) and chronic diastolic heart failure with aortic stenosis s/p transfemoral TAVR with Yonatan S3 (23mm) on 17. Had follow up with Dr. Worley 17, doing well since surgery, complaining of dizziness. Awaiting event monitor, ordered by Dr. Worley. Labs were normal (CBC, coags, BMP, BNP). EKG normal. Reports it has been improving. Carotid us 2017 without evidence of hemodynamically significant stenosis of either carotid system.  Bothered by hives after starting Plavix, changed to effient .  Hives persist.  It may have been bed bugs.  s/p TAVR, and now doing better.  Stamina is better, mild STAPLETON.  PAP is 54mm Hg post TAVR. also has a lot of orthopedic issues. Had epidural 2018 at [facility], still c/o radicular pain left leg EKG: NSR, normal axis and intervals, no ST-Tw abnormalities. 19 Mult c/o 18: Dizziness since end of .  Worse with getting out of bed, room spinning. 18 repeat echo with Dr MORA was good.  Here c/o pain over temples, will check ESR 19 Fell at home Dec 21st 2018.  + N/V, went to [facility] ER  2 days later, + SDH. Sent to Dr Marcano on 18.  MRI done.  Also needs to have a right TKR in near future.  3/1/19- for 3/26/19 with Dr Schwartz 19  s/p right TKR, multiple breast bxs, B/L lumpectomies on Aug 19th, no CA.  Prediabetic 20 Sister  in , diverticulitis and ? CA.  Chronic STAPLETON.  She also reports a "pulling: at her left shoulder.  No recurrence.  Walks without CP.  Fell in Oct striking face and left eyebrow.  Residual nodule which is painful. 3/8/21 Torn ligamen left wrist(Dr Rand), Right eyebrow pain where she feel on it, bone feels abnormal.  Gets pain when sleeping on right face,.. Has phlegm in throat.  Concerned about the thyroid 21 s/p thyroid surgery complicated by hemorrhage.  Torn ligament right foot.  Glucose 139, Mildly elevated Lipids.  Had the Covid Vaccines 10/28/21 had B/L shoulder pain then progressive pain and weakness.  Finally saw Rheum, Dr Jewel Corona and Alejandra Crisostomo. Dx with PMR and placed on steroids. Pt reports being hyper on steroids and BP is up. 01/10/22: here for BP check. Reports high readings at home (>160). Worsening headaches when BP high. 3/21/22 BP labile, more high than low. Getting anxious/panic attacks. 22 STAPLETON, echo today with MS and PAP of 49 mm Hg 10/12/22 On increased Toprol 50 BID.  Going to [place] Oct 21 to 2022. 23 Has chronic back pain.  Plans a trip to [place] Aug 2023. On Pred for PMR. To see Dr Rehnaa Hall, saw the NP.. On Pred 2 mg.  .  c/o HA 23 labile BPs, had to go to the ER. 25 bothered by ocular migraines and HAs.  BP at home 200 systolic and 170 at orthopedics.  23   Going to [place] for 1 mo. 23 Did not have a good visit, very stressful.  Returned 23  Had a bad HA, BPs were up.  Also has sciatica. 24 having high BPs at home -up to 200/110. feeling well overall. no changes in recent lifestyle. went to rheumatologist 4 days ago and had a low BP -took valsartan 2x that morning. down to 1mg daily of prednisone. pain is well controlled.  24 having visual symptoms. +floaters. followed closely by Dr Barbosa and Dr Hall who have been treating her with prednisone. reports SOB with exertion that is chronic. BP has been stable since taking valsartan TID  24 s/p admission for SVT -aborted with adenosine x2. TTE (24): EF 68%, mod dilated LA; TAVR in place with mild AR, Mod MR / MS, Mod-severe TR. describes feeling of "electricity" going throughout her body. had previously had it in her knee -feels it as an impulse. currently on 8mg of prednisone d/t floaters. weaning slowly. can walk a half a block and has to stop.  24 Sees Rheum, Optho and NeuroOphtho: seeing red spots, they got bigger, saw William Barbosa and Alon.  2nd opinion at [facility]. Referred to vascular. Saw Dr Saenz and carotid OK.   She notices facial erythema.  Denies ETOH. In [facility] for SVT.    Had a sleep study. 25 Has seen multiple MDs.  Spilled coffee on herself. 25 bp has been severely elevated. having severe headaches. bp now low. current regimen is metoprolol 125mg in am and 100mg in pm, valsartan 80mg BID. amlodipine 2.5mg / amlodipine was added by the ED. She is checking her bp frequently at home   25 here for elevated bp. checking bp at home and frequently in the 190s. headaches are often. mentions feeling like she has electricity racing through her body that she is concerned is related to anxiety. EKG -NSR, PVC, IVCD

## 2025-04-18 NOTE — HISTORY OF PRESENT ILLNESS
[FreeTextEntry1] : [facility] Dr. Dave.    Knee- Dr Schwartz Hand- Polatsch TAVR- Dr Worley  Pain- Dr Saldaña @ [facility] Breast- Adin Taylor Tongsen PCP- Norbert Fish Derm- Dr Rand Onc- Dr Boykin Rheum- Dr Jewel [address] Endo- Pastora VALLE- Maribell  EKG: NSR, normal axis and intervals, no ST-Tw abnormalities. 1/25/19, 12/16/20

## 2025-04-18 NOTE — ASSESSMENT
[FreeTextEntry1] : anxiety -referred to pcp -discussed lifestyle changes such as staying active and social which she has been doing.   HTN: Reports elevated readings at home SBP ranging 160-190. BP slightly above goal 135-145/ 80 mmHg. - On Valsartan 80mg BID. Given IHSS pathology (stated below), will increase beta blocker to 50 BID. Also takes Valsartan as 40 QID, BP low today. Told to reduce Valsartan. Increase BB to 100 mg BID. Procardia 10 mg PRN. Screeining for a Pheo, this was (-). BP excellent. I suspect BP was up D/T pain and prednisone. Mildly elevated, no changes. 1/30/24 -will increase valsartan 40 to TID and do ABPM. suspect home BPs are falsely elevated. needs a bigger cuff. if ABPM shows any lows on valsartan TID, can cut back to 20mg for middle of the day dose 4/5/24 BP stable on TID dosing 4/10/25 bp low today. asymptomatic. will stop amlodipine 2.5mg. current plan is valsartan 80mg BID and metop 125 in am, 100 in pm. follow up closely. stop checking bps at home. refer to neuro if headaches persist on stable regimen 4/18/25 bp stable -cont current regimen. referred to neuro for headaches.   Pul HTN -likely multifactorial. may be d/t MR/MS, however there is a dramatic increase in PAP that has not aligned with valve fx.   referred to pulm   D-dimer 296. PAP down to 44 mm July 2024.  Pt reports occ wheezing, given Flovent.  SVT -will get 2 wk zio today. cont metoprolol   Frequent SVT while on Toprol 200/day   visual sxs -cont rheum and optho f/u, no concern for orthostatic bp after monitor   Aortic Stenosis: s/p TAVR 2017. No SOB with ADL's. Still STAPLETON after several blocks. Likely orthopedic issues which limit her. TTE Oct 2020 hyperdynamic LV, LVH, with cavity obliteration, resting gradient 14 mmHg, increases to 46mmHg with valsalva. No issues with TAVR.  Now on high dose BBs  Mitral Stenosis- c/o SOB/STAPLETON, MVA 1.2 cm2 with PAP of 49 mm Hg, Already on Toprol 50 mg BID. Pt to see Dr Worley. Valve is degenerative not a candidate for MV procedure. She also got a 2nd opinion @ [facility], told the same thing.  - INCREASE Toprol to 50mg BID, somewhat better. Will increase further when she returns from her trip. Now on 100 BID.  Adding 25 mg more.  Dizziness: event monitor, unrevealing. No further work up at this time. EST did not show anything (-) either. s/p fall off of a step ladder, + SDH. Back on ASA. Fell in bathtub.  Chronic STAPLETON- multifactorial, orthopedic issues, deconditioning, age, MR/MS, elevated PAPs. Has IHSS Physiology, continue Toprol 100 BID 4/5/24 -likely multifactorial -will repeat echo   Radiculopathy- Gabapentin given  s/p thyroid surgery- Benign Hurthle cell tumor.  Flu shot HD given 9/17/18,11/6/19, Oct 2020 elsewhere. Had Covid Vaccines  Prediabetes- diet reviewed, labs drawn @ Rheum. Cr 1.46 HLD- Zocor increased to 40  Anxiety- trial of Buspar 3/21/22.

## 2025-04-18 NOTE — PHYSICAL EXAM
[Normal Appearance] : normal appearance [General Appearance - Well Developed] : well developed [Well Groomed] : well groomed [General Appearance - Well Nourished] : well nourished [No Deformities] : no deformities [General Appearance - In No Acute Distress] : no acute distress [Normal Conjunctiva] : the conjunctiva exhibited no abnormalities [Eyelids - No Xanthelasma] : the eyelids demonstrated no xanthelasmas [Respiration, Rhythm And Depth] : normal respiratory rhythm and effort [Exaggerated Use Of Accessory Muscles For Inspiration] : no accessory muscle use [Auscultation Breath Sounds / Voice Sounds] : lungs were clear to auscultation bilaterally [Heart Rate And Rhythm] : heart rate and rhythm were normal [Heart Sounds] : normal S1 and S2 [Arterial Pulses Normal] : the arterial pulses were normal [Edema] : no peripheral edema present [Abdomen Soft] : soft [Abdomen Tenderness] : non-tender [Abdomen Mass (___ Cm)] : no abdominal mass palpated [Abnormal Walk] : normal gait [Gait - Sufficient For Exercise Testing] : the gait was sufficient for exercise testing [Nail Clubbing] : no clubbing of the fingernails [Cyanosis, Localized] : no localized cyanosis [Petechial Hemorrhages (___cm)] : no petechial hemorrhages [Skin Color & Pigmentation] : normal skin color and pigmentation [] : no rash [No Venous Stasis] : no venous stasis [Skin Lesions] : no skin lesions [No Skin Ulcers] : no skin ulcer [No Xanthoma] : no  xanthoma was observed [Oriented To Time, Place, And Person] : oriented to person, place, and time [Affect] : the affect was normal [Mood] : the mood was normal [FreeTextEntry1] : extremely anxious

## 2025-04-30 NOTE — PHYSICAL EXAM
[No Acute Distress] : no acute distress [Normal Sclera/Conjunctiva] : normal sclera/conjunctiva [EOMI] : extraocular movements intact [No Respiratory Distress] : no respiratory distress  [Clear to Auscultation] : lungs were clear to auscultation bilaterally [Normal] : affect was normal and insight and judgment were intact

## 2025-04-30 NOTE — HISTORY OF PRESENT ILLNESS
[FreeTextEntry1] : HTN follow up [de-identified] : HA - March 28-29 developed HA and developed ocular migraine and thought "head was going to explode" a/w read streaks in vision - next day too tylenol w/ mild improvement - following day felt like head was going to explode again, checked BP at home and was >200 - spoke with cardio and scheduled appointment, bp in office was 130 - 2 weeks ago developed severe HA again and went to [facility] ED for evaluation - tx w/ IV medication. CT head was normal - following day went back to ED w/ severe HA - cardio changed medication; was previously on valsartan 40mg TID now on valsartan 80mg BID - prior to this pt has been trying to titrate off prednisone was on 1mg daily - last week spoke with ophtho who recommended increasing prednisone to 5mg daily - last MRI Brain 2019 after fall showed resolution of subdural hematoma.

## 2025-05-23 NOTE — PAST MEDICAL HISTORY
[Postmenopausal] : The patient is postmenopausal [Menarche Age ____] : age at menarche was [unfilled] [Menopause Age____] : age at menopause was [unfilled] [unknown] : the patient is unsure of the date of her LMP [Total Preg ___] : G[unfilled] [History of Hormone Replacement Treatment] : has no history of hormone replacement treatment [FreeTextEntry5] : D & C in her 40's  [FreeTextEntry6] : None [FreeTextEntry7] : None [FreeTextEntry8] : No history

## 2025-05-23 NOTE — ASSESSMENT
[FreeTextEntry1] : 92 yo female presents for high-risk breast cancer screening, she is doing well with regards to her breast health. No breast complaints today. Reviewed the benign results of her mammogram and ultrasound. Will plan on mammogram and US in 1 year an RTC after for re-evaluation. All questions were answered; the patient verbalized understanding and is in agreement with the plan.

## 2025-05-23 NOTE — DATA REVIEWED
[FreeTextEntry1] : --5/21/19 she completed b/l mmg/US which recommended right breast biopsies and additional spot compression views, both of which were performed on 5/31/19.   --mammogram (5/31/19) recommended stereotactic biopsies of the right breast x2 and left breast x1; it also recommended an additional biopsy or breast MRI for architectural distortion in the right breast, as well an an MRI vs US for a probable mass in the left breast, and a MRI for asymmetry and distortion in the left breast (if biopsies are benign).   -Right breast US core biopsy was completed on 5/31/19, results showed right breast hyperplastic nodular apocrine metaplasia, and minimal ALH in adjoining parenchyma.  --6/27/19 she underwent right breast makenzie guided biopsies showing right upper outer mid third pathology UDH, apocrine metaplasia, fibroadenomatoid change and calcifications. Right breast with architectural distortion pathology UDH and apocrine metaplasia.  --7/22/19 ([facility]) MRI breasts: right breast w/ non-mass enhancement, residual atypia cannot be excluded, recommended excision. Left breast w/ area of non-mass enhancement x2, pathology reported radial scar and LCIS and ALH, surgical excision is recommended. Left breast 6:30n1.65, indeterminate enhancement, MRI biopsy or MRI wire loc is recommended. BI-RADS 4.   --8/19/19 ([facility]) Surgical pathology reports: left breast 1:00 excision, sclerosing adenosis, duct ectasia, fibroadenomatoid and fibrocystic changes, biopsy site changes; left 3:00 excision, radial scar, duct ectasia, fibroadenomatoid and fibrocystic changes, biopsy site changes; left 6:30 excision, UDH, duct ectasia, columnar cell changes. Right deep retroareolar excision, ALH, radial scar, intraductal papillomas, fibroadneomatoid changes, duct ectasia, and UDH, biopsy site changes.  12/15/2020 ([facility]) mmg b/l: heterogenously dense. Benign findings. BI-RADS 2  12/15/2020 ([facility]) MRI breasts:RIGHT BREAST: No MRI evidence of malignancy. LEFT BREAST: 0.5 cm enhancing lesion 6:00, 1.5 cm from the nipple with indeterminate enhancement kinetics, delayed plateau type enhancement. It is unchanged in size and likely benign. However, question papilloma. Follow-up MRI in 6-12 months is recommended. LUNGS: Few subcentimeter nodular areas in the left lung on T1-weighted images. CT scan dated 7/20/2017 and reported bilateral pulmonary nodules. FOLLOW-UP CT SCAN OF THE CHEST IS RECOMMENDED. RECOMMENDATION: Chest CT scan and MRI in 6 months. BI-RADS 3  6/1/21 ([facility]) MRI breasts: no suspicious findings. BI-RADS 2.   12/8/21 ([facility]) B/l mmg & US: heterogenously dense. No mammographic or sonographic evidence of malignancy. BI-RADS 2.   5/31/24 ([facility]) b/l screening mammo and US: heterogeneously dense. LEFT breast 1:00 3 cm from the nipple is a complex mass measuring 8 x 5 x 9 mm. Targeted ultrasound is recommended. BI-RADS 0.   6/26/24 ([facility]) left US: No suspicious solid mass.  2 cystic nodules with some internal echoes measuring 0.88 x 0.56 x 0.85 cm and 0.815 x 0.55 x 0.67 cm essentially unchanged in comparison to previous examination dated December 8, 2021. BI-RADS 2  5/22/25 ([facility]) b/l screening mammo and US: heterogeneously dense. Benign findings. No evidence of malignancy. BI-RADS 2.

## 2025-05-23 NOTE — PHYSICAL EXAM
[Normocephalic] : normocephalic [Atraumatic] : atraumatic [No Supraclavicular Adenopathy] : no supraclavicular adenopathy [Examined in the supine and seated position] : examined in the supine and seated position [No dominant masses] : no dominant masses in right breast  [No dominant masses] : no dominant masses left breast [No Nipple Retraction] : no left nipple retraction [No Nipple Discharge] : no left nipple discharge [No Axillary Lymphadenopathy] : no left axillary lymphadenopathy [No Edema] : no edema [No Rashes] : no rashes [No Ulceration] : no ulceration [No Cervical Adenopathy] : no cervical adenopathy [Breast Nipple Inversion] : nipples not inverted [Breast Nipple Retraction] : nipples not retracted [Breast Nipple Flattening] : nipples not flattened [Breast Nipple Fissures] : nipples not fissured

## 2025-05-23 NOTE — REVIEW OF SYSTEMS
[Cough] : cough [As Noted in HPI] : as noted in HPI [Breast Pain] : no breast pain [Breast Lump] : no breast lump [Negative] : Integumentary [FreeTextEntry4] : seeing red dots in visual field  [FreeTextEntry7] : abdominal mass [de-identified] : headaches recently

## 2025-05-23 NOTE — HISTORY OF PRESENT ILLNESS
[FreeTextEntry1] : Concetta is a 91 year old female who presents for follow-up high risk screening. She has a history of left atypical lobular hyperplasia and LCIS and right atypical lobular hyperplasia s/p bilateral excisional biopsies on 8/19/19 by Dr. Brooklynn Arriaga. Pt denies palpable masses, skin lesions/changes, or nipple discharge.  She met with Dr. Boykin (med onc) in Jan 2020, declined chemoprevention at that time.

## 2025-06-09 NOTE — PHYSICAL EXAM
[General Appearance - In No Acute Distress] : in no acute distress [General Appearance - Alert] : alert [General Appearance - Well Nourished] : well nourished [Sclera] : the sclera and conjunctiva were normal [Examination Of The Oral Cavity] : the lips and gums were normal [Respiration, Rhythm And Depth] : normal respiratory rhythm and effort [Exaggerated Use Of Accessory Muscles For Inspiration] : no accessory muscle use [Auscultation Breath Sounds / Voice Sounds] : lungs were clear to auscultation bilaterally [Edema] : there was no peripheral edema [No Spinal Tenderness] : no spinal tenderness [Abnormal Walk] : normal gait [Musculoskeletal - Swelling] : no joint swelling seen [] : no rash [Oriented To Time, Place, And Person] : oriented to person, place, and time [Impaired Insight] : insight and judgment were intact [Mood] : the mood was normal [Affect] : the affect was normal [FreeTextEntry1] : No active synovitis of the upper and lower extremities bilaterally.

## 2025-06-09 NOTE — REVIEW OF SYSTEMS
[Eyesight Problems] : eyesight problems [Feeling Tired] : feeling tired [Cough] : cough [Negative] : Cardiovascular

## 2025-06-09 NOTE — ASSESSMENT
[FreeTextEntry1] : 91-year-old woman returns for follow up of PMR. Patient with visit longstanding polymyalgia rheumatica, cotninues with prednisone on daily basis.  Previous trials of tapering, may have been associated with increase in red spots in vision, following closely with neuro ophthalmologist, unclear etiology thought possibly ocular migraines, but feels symptoms may increase with further tapering of prednisone.  Prednisone 5 mg qday controls symptoms, and therefore will continue current dose at this time.  Patient with cough following esophogram procedure, will check xray today to assess for possible pneumonia, has follow up with pulmonary tomorrow. History of osteoporosis, patient continues alendronate 70 mg/week as per endocrinology, patient considering dental implant, will discuss with endocrinologist given alendronate use. Further management pending results.  Addendum: June 5, 2025 Spoke with patient, reviewed chest xray results:  IMPRESSION: Lungs clear. Postop Tavr. Vascular calcification thoracic aorta. No pneumothorax consolidation or pleural effusion. No acute bone abnormality.

## 2025-06-09 NOTE — HISTORY OF PRESENT ILLNESS
[FreeTextEntry1] : June 5, 2025 Patient returns for follow up, has not been feeling well lately Reports blood pressure fluctuations, following closely with cardiologist Reports red spots have returned, no vision changes Following with neuro ophthalmologist, will continue prednisone 5 mg qday for now,  Temporal Artery US May 5, 2025 no temporal artery inflammatory changes noted Had UTI went to hospital/ER, repeated urinalysis negative muscle spasms, went to er, prescribed antibiotics  Had esophagram as feels difficulty swallowing since thyroid surgery, esophageal dysmotility, possible cardiac clearance for endoscopy Following esophagram, feel developed cough which has been persistent, worse at night, has follow up with pulmonary tomorrow, will check chest xray  March 24, 2025 Patient returns for follow up of PMR, patient overall feeling well Since last visit, patient reports spilling hot coffee on chest wall and left hand, now healed Completed pulmonary rehab at [facility], does not feel helping too much with shortness of breath with ambulation.  Usually can walk about one block and then rests and feels better Had follow up 3/17 for mitral valve with surgeon, will see cardiologist 2/2025 to discuss possible medication changes 1/2025 ESR 42 Prednisone 1 mg qday Eye spots every so often but not as often, ocular migraines, resolve on own, sees different shapes and dots Will see Dr. Alejo for follow up  April 23 Walking ok, no falls Continues alendronate on Sundays, patient reports possible implants, would recommend discussing with oral surgeon and endocrinologist given chronic alendronate us Feels pain in the lumbar spine while standing and walking, gets better with rest  December 5, 2024 Patient returns for a follow up visit of PMR Patient decreased prednisone to 2mg two days ago, reports some aching symptoms in the shoulders but not severe pain and not associated with morning stiffness Followed up with Dr. Carty in August and October, reviewed labs with patient.   Underwent blood work with PCP in October, results reviewed during today's visit, Recent A1c 6.1% Awaiting refills for levothyroxine 137 mcg Patient will be starting pulmonary rehab at [place], had recent breathing test, treadmill breathing test, and EKG on Monday Reports return of red spots in vision intermittently Reports facial rash since taking prednisone but less  Continues walking daily  No headaches, no jaw claudication  September 9, 2024 Patient returns for a follow up visit of PMR Patient overall feeling well. 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 Patient continues prednisone 6 mg daily, did not continue to decrease prednisone as worried about other medical conditions. Reviewed recent labs with patient, patient concerned about weight as well, will continue to decrease prednisone 1 mg every 4 weeks as tolerated. Patient experiences intermittent pain in the hips but does not feel related to PMR symptoms feels more degenerative Denies headaches  The patient visited two neurologists and optometrist re: visual snow syndrome  Patient was seen by pulmonologist, had 6-minute walk test and PFTs Patient followed up with Dr. Carty The patient is walking around her apartment and walking outside for exercise Patient continues alendronate 70 mg weekly reviewed bone density December 2023 with osteopenia, if able to discontinue prednisone would discontinue alendronate as well. The patient will be starting pulmonary rehab  June 5th, 2024 Pt arrives for follow up Since last visit, patient developed tachycardia (180BPM) May 31 2024, was taken to the ER at [place]. had echo and treated with medications to slow the heart rate Patient feeling better, since that time, was evaluated by endocrinologist, and cardiologist, had medications adjusted.Has follow up with cardiologist tomorrow Pt given magnesium, land decreased levothyroxine dose as well. Takes baby aspirin daily.  In terms of eye symptoms, patient feels as though less symptoms, less episodes of the red dots Pt is on 8mg of prednisolone for two weeks, pt advised to do weekly taper. Will continue to reduce prednisolone by 1mg every 3 weeks.  March 4, 2024 Patient returns for follow up of PMR Patient reports pain in hip for the last week, starting with right and then left, most recently in right hip this morning, will follow with Dr. Mireles Reports redness in cheeks for the last 6 months Reports tiny dots in vision periodically, comes and goes, two weeks ago had it from 3:30pm to night time, started a year and half to two years ago Patient had ocular migraines previously, spots in vision not accompanied by migraines or headaches Patient feels vision has changed, however on exam vision remains the same No jaw pain while chewing Patient noted stye in right eye two days ago, started antibiotic drops tid last night Patient is currently taking alendronate Started on Prednisone 10 mg as per Dr. Barbosa, felt benefit for a few days in eyes symptoms with less frequency of events, currently taking 5 mg qday, will continue for now Patient was evaluated by Dr. Herzog in cardiology, was taking valsartan 40 mg bid, switched to every 8 hours Previously took prednisone 5 or 10 mg years ago after blood pressure was elevated and went to urgent care When initially diagnosed with PMR, patient had pain in foot and shoulder, noted spurs at that time, reports two incidents where patient could not move at all prior to diagnosis Patient was evaluated by nuclear optometry Following with Dr. Barbosa on March 13, and then PCP March 14th Patient concerned about sugars, A1c 5.9 in December Family history of MI (father), Cancer (sisters), hernia (mother), PMR (sister), ROV (cousin) Many years ago had sciatica Reviewed temporal artery ultrasound with patient Evaluated by Dr. Tommie Chi in neuro-ophthalmology Discussed monitoring blood pressure at home, especially when experiences visual symptoms perhaps related to low blood pressure or fluctuations  January 26, 2024 Patient returns for follow up of PMR Patient is feeling generally well Patient reports increasing pain in left knee secondary to DJD (s/p right knee replacement previously) Reports many red "pindrops" in vision periodically, sometimes lasting minutes, also with nuclear migraines, evaluated by Dr. Tommie Chi in neuro-ophthalmology Also reports increased redness in cheeks Blood pressure low in office today (98/62), patient feels may have accidentally taken 2 doses of blood Valsartan 40 mg today, feeling well, encouraged increased hydration and will repeat blood rpessure tonight prior to metoprolol dose and hold if remains lower than usual. Patient is currently taking prednisone 2mg and 1 mg on alternating days since last visit, will taper to 1 mg qday for one month, then decrease to 1 mg on alternating days with plans to discontinue Patient was evaluated by pain management, had epidural with some benefit, received gel injection in hip and reports increase in pain History of right TKA No headaches, no jaw pain, Labs reviewed  September 27, 2023 Patient returns for follow up of PMR Patient currently taking prednisone 2 mg daily, continued tapering since last visit from 4 mg daily Patient overall feeling well, has some slight discomfort in the upper shoulders but otherwise feels well No headaches, no jaw pain, no vision changes Patient continues alendronate 70 mg weekly, has endocrine follow-up in December 2023. Patient had recent facet injections September 11, has follow-up with [facility]NR as well Feels benefit in terms of low back pain from the injections.  May 9, 2023 Patient with history of PMR Diagnosed in September 2021 Started on prednisone 20 mg qday with good response in terms of joint pain and stiffness Patient describes initial symptoms as feeling paralyzed and unable to get out of bed. Initially started in the right shoulder, then spread to left shoulder and bilateral hips Patient has been tapering prednisone over the past 18 months, most recently tapered to 1 mg qday, but repeat blood test with elevated ESR and CRP. Patient is currently taking prednisone 5 mg qday, repeat ESR and CRP in the normal range. Patient is concerned about side effects of prednisone. Patient will be traveling to [place] in the summer and concerned about possible pain. Discussed slow taper, patient currently asymptomatic. Previous taper every 2 weeks of 1 mg, with increasing inflammatory markers when prednisone decreased to 1 mg. Discussed tapering prednisone 1 mg every 4 weeks, will decrease prednisone to 4 mg. Patient continues alendronate for osteoporosis. Reviewed method of administration with patient, no side effects noted. Patient with labile blood pressure, today well controlled. metoprolol 100 bid valsartan 40 bid

## 2025-06-09 NOTE — PHYSICAL EXAM
[General Appearance - Alert] : alert [General Appearance - In No Acute Distress] : in no acute distress [General Appearance - Well Nourished] : well nourished [Examination Of The Oral Cavity] : the lips and gums were normal [Sclera] : the sclera and conjunctiva were normal [Respiration, Rhythm And Depth] : normal respiratory rhythm and effort [Exaggerated Use Of Accessory Muscles For Inspiration] : no accessory muscle use [Auscultation Breath Sounds / Voice Sounds] : lungs were clear to auscultation bilaterally [Edema] : there was no peripheral edema [Abnormal Walk] : normal gait [No Spinal Tenderness] : no spinal tenderness [Musculoskeletal - Swelling] : no joint swelling seen [Oriented To Time, Place, And Person] : oriented to person, place, and time [] : no rash [Mood] : the mood was normal [Impaired Insight] : insight and judgment were intact [Affect] : the affect was normal [FreeTextEntry1] : No active synovitis of the upper and lower extremities bilaterally.

## 2025-06-09 NOTE — HISTORY OF PRESENT ILLNESS
[FreeTextEntry1] : June 5, 2025 Patient returns for follow up, has not been feeling well lately Reports blood pressure fluctuations, following closely with cardiologist Reports red spots have returned, no vision changes Following with neuro ophthalmologist, will continue prednisone 5 mg qday for now,  Temporal Artery US May 5, 2025 no temporal artery inflammatory changes noted Had UTI went to hospital/ER, repeated urinalysis negative muscle spasms, went to er, prescribed antibiotics  Had esophagram as feels difficulty swallowing since thyroid surgery, esophageal dysmotility, possible cardiac clearance for endoscopy Following esophagram, feel developed cough which has been persistent, worse at night, has follow up with pulmonary tomorrow, will check chest xray  March 24, 2025 Patient returns for follow up of PMR, patient overall feeling well Since last visit, patient reports spilling hot coffee on chest wall and left hand, now healed Completed pulmonary rehab at [facility], does not feel helping too much with shortness of breath with ambulation.  Usually can walk about one block and then rests and feels better Had follow up 3/17 for mitral valve with surgeon, will see cardiologist 2/2025 to discuss possible medication changes 1/2025 ESR 42 Prednisone 1 mg qday Eye spots every so often but not as often, ocular migraines, resolve on own, sees different shapes and dots Will see Dr. Alejo for follow up  April 23 Walking ok, no falls Continues alendronate on Sundays, patient reports possible implants, would recommend discussing with oral surgeon and endocrinologist given chronic alendronate us Feels pain in the lumbar spine while standing and walking, gets better with rest  December 5, 2024 Patient returns for a follow up visit of PMR Patient decreased prednisone to 2mg two days ago, reports some aching symptoms in the shoulders but not severe pain and not associated with morning stiffness Followed up with Dr. Carty in August and October, reviewed labs with patient.   Underwent blood work with PCP in October, results reviewed during today's visit, Recent A1c 6.1% Awaiting refills for levothyroxine 137 mcg Patient will be starting pulmonary rehab at [place], had recent breathing test, treadmill breathing test, and EKG on Monday Reports return of red spots in vision intermittently Reports facial rash since taking prednisone but less  Continues walking daily  No headaches, no jaw claudication  September 9, 2024 Patient returns for a follow up visit of PMR Patient overall feeling well. 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 Patient continues prednisone 6 mg daily, did not continue to decrease prednisone as worried about other medical conditions. Reviewed recent labs with patient, patient concerned about weight as well, will continue to decrease prednisone 1 mg every 4 weeks as tolerated. Patient experiences intermittent pain in the hips but does not feel related to PMR symptoms feels more degenerative Denies headaches  The patient visited two neurologists and optometrist re: visual snow syndrome  Patient was seen by pulmonologist, had 6-minute walk test and PFTs Patient followed up with Dr. Carty The patient is walking around her apartment and walking outside for exercise Patient continues alendronate 70 mg weekly reviewed bone density December 2023 with osteopenia, if able to discontinue prednisone would discontinue alendronate as well. The patient will be starting pulmonary rehab  June 5th, 2024 Pt arrives for follow up Since last visit, patient developed tachycardia (180BPM) May 31 2024, was taken to the ER at [place]. had echo and treated with medications to slow the heart rate Patient feeling better, since that time, was evaluated by endocrinologist, and cardiologist, had medications adjusted.Has follow up with cardiologist tomorrow Pt given magnesium, land decreased levothyroxine dose as well. Takes baby aspirin daily.  In terms of eye symptoms, patient feels as though less symptoms, less episodes of the red dots Pt is on 8mg of prednisolone for two weeks, pt advised to do weekly taper. Will continue to reduce prednisolone by 1mg every 3 weeks.  March 4, 2024 Patient returns for follow up of PMR Patient reports pain in hip for the last week, starting with right and then left, most recently in right hip this morning, will follow with Dr. Mireles Reports redness in cheeks for the last 6 months Reports tiny dots in vision periodically, comes and goes, two weeks ago had it from 3:30pm to night time, started a year and half to two years ago Patient had ocular migraines previously, spots in vision not accompanied by migraines or headaches Patient feels vision has changed, however on exam vision remains the same No jaw pain while chewing Patient noted stye in right eye two days ago, started antibiotic drops tid last night Patient is currently taking alendronate Started on Prednisone 10 mg as per Dr. Barbosa, felt benefit for a few days in eyes symptoms with less frequency of events, currently taking 5 mg qday, will continue for now Patient was evaluated by Dr. Herzog in cardiology, was taking valsartan 40 mg bid, switched to every 8 hours Previously took prednisone 5 or 10 mg years ago after blood pressure was elevated and went to urgent care When initially diagnosed with PMR, patient had pain in foot and shoulder, noted spurs at that time, reports two incidents where patient could not move at all prior to diagnosis Patient was evaluated by nuclear optometry Following with Dr. Barbosa on March 13, and then PCP March 14th Patient concerned about sugars, A1c 5.9 in December Family history of MI (father), Cancer (sisters), hernia (mother), PMR (sister), ROV (cousin) Many years ago had sciatica Reviewed temporal artery ultrasound with patient Evaluated by Dr. Tommie hCi in neuro-ophthalmology Discussed monitoring blood pressure at home, especially when experiences visual symptoms perhaps related to low blood pressure or fluctuations  January 26, 2024 Patient returns for follow up of PMR Patient is feeling generally well Patient reports increasing pain in left knee secondary to DJD (s/p right knee replacement previously) Reports many red "pindrops" in vision periodically, sometimes lasting minutes, also with nuclear migraines, evaluated by Dr. Tommie Chi in neuro-ophthalmology Also reports increased redness in cheeks Blood pressure low in office today (98/62), patient feels may have accidentally taken 2 doses of blood Valsartan 40 mg today, feeling well, encouraged increased hydration and will repeat blood rpessure tonight prior to metoprolol dose and hold if remains lower than usual. Patient is currently taking prednisone 2mg and 1 mg on alternating days since last visit, will taper to 1 mg qday for one month, then decrease to 1 mg on alternating days with plans to discontinue Patient was evaluated by pain management, had epidural with some benefit, received gel injection in hip and reports increase in pain History of right TKA No headaches, no jaw pain, Labs reviewed  September 27, 2023 Patient returns for follow up of PMR Patient currently taking prednisone 2 mg daily, continued tapering since last visit from 4 mg daily Patient overall feeling well, has some slight discomfort in the upper shoulders but otherwise feels well No headaches, no jaw pain, no vision changes Patient continues alendronate 70 mg weekly, has endocrine follow-up in December 2023. Patient had recent facet injections September 11, has follow-up with PMNR as well Feels benefit in terms of low back pain from the injections.  May 9, 2023 Patient with history of PMR Diagnosed in September 2021 Started on prednisone 20 mg qday with good response in terms of joint pain and stiffness Patient describes initial symptoms as feeling paralyzed and unable to get out of bed. Initially started in the right shoulder, then spread to left shoulder and bilateral hips Patient has been tapering prednisone over the past 18 months, most recently tapered to 1 mg qday, but repeat blood test with elevated ESR and CRP. Patient is currently taking prednisone 5 mg qday, repeat ESR and CRP in the normal range. Patient is concerned about side effects of prednisone. Patient will be traveling to [place] in the summer and concerned about possible pain. Discussed slow taper, patient currently asymptomatic. Previous taper every 2 weeks of 1 mg, with increasing inflammatory markers when prednisone decreased to 1 mg. Discussed tapering prednisone 1 mg every 4 weeks, will decrease prednisone to 4 mg. Patient continues alendronate for osteoporosis. Reviewed method of administration with patient, no side effects noted. Patient with labile blood pressure, today well controlled. metoprolol 100 bid valsartan 40 bid

## 2025-06-19 NOTE — HISTORY OF PRESENT ILLNESS
[FreeTextEntry1] : Retina-. Dr. Dave.    Knee- Dr Schwartz Hand- Polatsch TAVR- Dr Worley  Pain- Dr Saldaña @ [facility] Breast- Adin Taylor Tongsen PCP- Norbert Fish Derm- Dr Rand Onc- Dr Boykin Rheum- Dr Jewel [address] Endo- Pastora VALLE- Maribell  EKG: NSR, normal axis and intervals, no ST-Tw abnormalities. 1/25/19, 12/16/20

## 2025-06-19 NOTE — PHYSICAL EXAM
[General Appearance - Well Developed] : well developed [Normal Appearance] : normal appearance [Well Groomed] : well groomed [General Appearance - Well Nourished] : well nourished [No Deformities] : no deformities [General Appearance - In No Acute Distress] : no acute distress [Normal Conjunctiva] : the conjunctiva exhibited no abnormalities [Eyelids - No Xanthelasma] : the eyelids demonstrated no xanthelasmas [Exaggerated Use Of Accessory Muscles For Inspiration] : no accessory muscle use [Respiration, Rhythm And Depth] : normal respiratory rhythm and effort [Auscultation Breath Sounds / Voice Sounds] : lungs were clear to auscultation bilaterally [Heart Rate And Rhythm] : heart rate and rhythm were normal [Heart Sounds] : normal S1 and S2 [Arterial Pulses Normal] : the arterial pulses were normal [Edema] : no peripheral edema present [Abdomen Soft] : soft [Abdomen Tenderness] : non-tender [Abdomen Mass (___ Cm)] : no abdominal mass palpated [Abnormal Walk] : normal gait [Gait - Sufficient For Exercise Testing] : the gait was sufficient for exercise testing [Nail Clubbing] : no clubbing of the fingernails [Cyanosis, Localized] : no localized cyanosis [Petechial Hemorrhages (___cm)] : no petechial hemorrhages [Skin Color & Pigmentation] : normal skin color and pigmentation [] : no rash [No Venous Stasis] : no venous stasis [No Skin Ulcers] : no skin ulcer [Skin Lesions] : no skin lesions [No Xanthoma] : no  xanthoma was observed [Oriented To Time, Place, And Person] : oriented to person, place, and time [Affect] : the affect was normal [Mood] : the mood was normal [FreeTextEntry1] : extremely anxious  Otezla Pregnancy And Lactation Text: This medication is Pregnancy Category C and it isn't known if it is safe during pregnancy. It is unknown if it is excreted in breast milk.

## 2025-06-19 NOTE — PHYSICAL EXAM
[General Appearance - Well Developed] : well developed [Normal Appearance] : normal appearance [Well Groomed] : well groomed [General Appearance - Well Nourished] : well nourished [No Deformities] : no deformities [General Appearance - In No Acute Distress] : no acute distress [Normal Conjunctiva] : the conjunctiva exhibited no abnormalities [Eyelids - No Xanthelasma] : the eyelids demonstrated no xanthelasmas [Exaggerated Use Of Accessory Muscles For Inspiration] : no accessory muscle use [Respiration, Rhythm And Depth] : normal respiratory rhythm and effort [Auscultation Breath Sounds / Voice Sounds] : lungs were clear to auscultation bilaterally [Heart Rate And Rhythm] : heart rate and rhythm were normal [Heart Sounds] : normal S1 and S2 [Arterial Pulses Normal] : the arterial pulses were normal [Edema] : no peripheral edema present [Abdomen Soft] : soft [Abdomen Tenderness] : non-tender [Abdomen Mass (___ Cm)] : no abdominal mass palpated [Abnormal Walk] : normal gait [Gait - Sufficient For Exercise Testing] : the gait was sufficient for exercise testing [Nail Clubbing] : no clubbing of the fingernails [Cyanosis, Localized] : no localized cyanosis [Petechial Hemorrhages (___cm)] : no petechial hemorrhages [Skin Color & Pigmentation] : normal skin color and pigmentation [] : no rash [No Venous Stasis] : no venous stasis [No Skin Ulcers] : no skin ulcer [Skin Lesions] : no skin lesions [No Xanthoma] : no  xanthoma was observed [Oriented To Time, Place, And Person] : oriented to person, place, and time [Affect] : the affect was normal [Mood] : the mood was normal [FreeTextEntry1] : extremely anxious

## 2025-06-19 NOTE — REASON FOR VISIT
[FreeTextEntry1] : 88 year old female who is a former smoker with a history of HTN, hyperlipidemia, hypothyroidism, GERD/peptic ulcer disease (NSAID-induced) and chronic diastolic heart failure with aortic stenosis s/p transfemoral TAVR with Yonatan S3 (23mm) on 17. Had follow up with Dr. Worley 17, doing well since surgery, complaining of dizziness. Awaiting event monitor, ordered by Dr. Worley. Labs were normal (CBC, coags, BMP, BNP). EKG normal. Reports it has been improving. Carotid us 2017 without evidence of hemodynamically significant stenosis of either carotid system.  Bothered by hives after starting Plavix, changed to effient .  Hives persist.  It may have been bed bugs.  s/p TAVR, and now doing better.  Stamina is better, mild STAPLETON.  PAP is 54mm Hg post TAVR. also has a lot of orthopedic issues. Had epidural 2018 at [facility], still c/o radicular pain left leg EKG: NSR, normal axis and intervals, no ST-Tw abnormalities. 19 Mult c/o 18: Dizziness since end of .  Worse with getting out of bed, room spinning. 18 repeat echo with Dr MORA was good.  Here c/o pain over temples, will check ESR 19 Fell at home Dec 21st 2018.  + N/V, went to [facility] ER  2 days later, + SDH. Sent to Dr Marcano on 18.  MRI done.  Also needs to have a right TKR in near future.  3/1/19- for 3/26/19 with Dr Schwartz 19  s/p right TKR, multiple breast bxs, B/L lumpectomies on Aug 19th, no CA.  Prediabetic 20 Sister  in , diverticulitis and ? CA.  Chronic STAPLETON.  She also reports a "pulling: at her left shoulder.  No recurrence.  Walks without CP.  Fell in Oct striking face and left eyebrow.  Residual nodule which is painful. 3/8/21 Torn ligamen left wrist(Dr Rand), Right eyebrow pain where she feel on it, bone feels abnormal.  Gets pain when sleeping on right face,.. Has phlegm in throat.  Concerned about the thyroid 21 s/p thyroid surgery complicated by hemorrhage.  Torn ligament right foot.  Glucose 139, Mildly elevated Lipids.  Had the Covid Vaccines 10/28/21 had B/L shoulder pain then progressive pain and weakness.  Finally saw Rheum, Dr Jewel Corona and Alejandra Crisostomo. Dx with PMR and placed on steroids. Pt reports being hyper on steroids and BP is up. 01/10/22: here for BP check. Reports high readings at home (>160). Worsening headaches when BP high. 3/21/22 BP labile, more high than low. Getting anxious/panic attacks. 22 STAPLETON, echo today with MS and PAP of 49 mm Hg 10/12/22 On increased Toprol 50 BID.  Going to [place] Oct 21 to 2022. 23 Has chronic back pain.  Plans a trip to [place] Aug 2023. On Pred for PMR. To see Dr Rehana Hall, saw the NP.. On Pred 2 mg.  .  c/o HA 23 labile BPs, had to go to the ER. 25 bothered by ocular migraines and HAs.  BP at home 200 systolic and 170 at orthopedics.  23   Going to [place] for 1 mo. 23 Did not have a good visit, very stressful.  Returned 23  Had a bad HA, BPs were up.  Also has sciatica. 24 having high BPs at home -up to 200/110. feeling well overall. no changes in recent lifestyle. went to rheumatologist 4 days ago and had a low BP -took valsartan 2x that morning. down to 1mg daily of prednisone. pain is well controlled.  24 having visual symptoms. +floaters. followed closely by Dr Barbosa and Dr Hall who have been treating her with prednisone. reports SOB with exertion that is chronic. BP has been stable since taking valsartan TID  24 s/p admission for SVT -aborted with adenosine x2. TTE (24): EF 68%, mod dilated LA; TAVR in place with mild AR, Mod MR / MS, Mod-severe TR. describes feeling of "electricity" going throughout her body. had previously had it in her knee -feels it as an impulse. currently on 8mg of prednisone d/t floaters. weaning slowly. can walk a half a block and has to stop.  24 Sees Rheum, Optho and NeuroOphtho: seeing red spots, they got bigger, saw William Barbosa and Alon.  2nd opinion at [facility]. Referred to vascular. Saw Dr Saenz and carotid OK.   She notices facial erythema.  Denies ETOH. In [facility] for SVT.    Had a sleep study. 25 Has seen multiple MDs.  Spilled coffee on herself. 25 bp has been severely elevated. having severe headaches. bp now low. current regimen is metoprolol 125mg in am and 100mg in pm, valsartan 80mg BID. amlodipine 2.5mg / amlodipine was added by the ED. She is checking her bp frequently at home   25 here for elevated bp. checking bp at home and frequently in the 190s. headaches are often. mentions feeling like she has electricity racing through her body that she is concerned is related to anxiety. 25 Bout of SVT, EMS CV her in her apt without sedation then took her to the [facility] ER. Started on Amio 2000 daily and Toprol reduced to 25 mg.  Zocor DCed, will start  Atorvastatin 20 EKG -NSR, PVC, IVCD

## 2025-06-19 NOTE — ASSESSMENT
[FreeTextEntry1] : PreOp- Needs clearance for endoscopy.  Recent SVT.  Meds changed at [facility] but no further SVT, chronic LBBB. Pt is cleared for endoscopy.  OK to hold ASA for 5-7 days if needed.  HTN: Reports elevated readings at home SBP ranging 160-190. BP slightly above goal 135-145/ 80 mmHg. - On Valsartan 80mg BID. Given IHSS pathology (stated below), will increase beta blocker to 50 BID. Also takes Valsartan as 40 QID, BP low today. Told to reduce Valsartan. Increase BB to 100 mg BID. Procardia 10 mg PRN. Screening for a Pheo, this was (-). BP excellent. I suspect BP was up D/T pain and prednisone. Mildly elevated, no changes. 1/30/24 -will increase valsartan 40 to TID and do ABPM. suspect home BPs are falsely elevated. needs a bigger cuff. if ABPM shows any lows on valsartan TID, can cut back to 20mg for middle of the day dose 4/5/24 BP stable on TID dosing 4/10/25 bp low today. asymptomatic. will stop amlodipine 2.5mg. current plan is valsartan 80mg BID and metop 125 in am, 100 in pm. follow up closely. stop checking bps at home. refer to neuro if headaches persist on stable regimen 4/18/25 bp stable -cont current regimen. referred to neuro for headaches.   Pul HTN -likely multifactorial. may be d/t MR/MS, however there is a dramatic increase in PAP that has not aligned with valve fx.   referred to pulm   D-dimer 296. PAP down to 44 mm July 2024.  Pt reports occ wheezing, given Flovent.  SVT -will get 2 wk zio today. cont metoprolol   Frequent SVT while on Toprol 200/day.  Admitted to [facility], changed to AAmio 200 mg.  Will have pt f/u with Dr Reyna.  visual sxs -cont rheum and optho f/u, no concern for orthostatic bp after monitor   Aortic Stenosis: s/p TAVR 2017. No SOB with ADL's. Still STAPLETON after several blocks. Likely orthopedic issues which limit her. TTE Oct 2020 hyperdynamic LV, LVH, with cavity obliteration, resting gradient 14 mmHg, increases to 46mmHg with valsalva. No issues with TAVR.  Now on high dose BBs  Mitral Stenosis- c/o SOB/STAPLETON, MVA 1.2 cm2 with PAP of 49 mm Hg, Already on Toprol 50 mg BID. Pt to see Dr Worley. Valve is degenerative not a candidate for MV procedure. She also got a 2nd opinion @ [facility], told the same thing.  - INCREASE Toprol to 50mg BID, somewhat better. Will increase further when she returns from her trip. Now on 100 BID.  Adding 25 mg more.  Dizziness: event monitor, unrevealing. No further work up at this time. EST did not show anything (-) either. s/p fall off of a step ladder, + SDH. Back on ASA. Fell in bathtub.  Chronic STAPLETON- multifactorial, orthopedic issues, deconditioning, age, MR/MS, elevated PAPs. Has IHSS Physiology, continue Toprol 100 BID 4/5/24 -likely multifactorial -will repeat echo   Radiculopathy- Gabapentin given  s/p thyroid surgery- Benign Hurthle cell tumor.  Flu shot HD given 9/17/18,11/6/19, Oct 2020 elsewhere. Had Covid Vaccines  Prediabetes- diet reviewed, labs drawn @ Rheum. Cr 1.46 HLD- Zocor increased to 40  Anxiety- trial of Buspar 3/21/22.

## 2025-06-19 NOTE — ASSESSMENT
[FreeTextEntry1] : PreOp- Needs clearance for endoscopy.  Recent SVT.  Meds changed at [facility] but no further SVT, chronic LBBB. Pt is cleared for endoscopy.  OK to hold ASA for 5-7 days if needed.  HTN: Reports elevated readings at home SBP ranging 160-190. BP slightly above goal 135-145/ 80 mmHg. - On Valsartan 80mg BID. Given IHSS pathology (stated below), will increase beta blocker to 50 BID. Also takes Valsartan as 40 QID, BP low today. Told to reduce Valsartan. Increase BB to 100 mg BID. Procardia 10 mg PRN. Screening for a Pheo, this was (-). BP excellent. I suspect BP was up D/T pain and prednisone. Mildly elevated, no changes. 1/30/24 -will increase valsartan 40 to TID and do ABPM. suspect home BPs are falsely elevated. needs a bigger cuff. if ABPM shows any lows on valsartan TID, can cut back to 20mg for middle of the day dose 4/5/24 BP stable on TID dosing 4/10/25 bp low today. asymptomatic. will stop amlodipine 2.5mg. current plan is valsartan 80mg BID and metop 125 in am, 100 in pm. follow up closely. stop checking bps at home. refer to neuro if headaches persist on stable regimen 4/18/25 bp stable -cont current regimen. referred to neuro for headaches.   Pul HTN -likely multifactorial. may be d/t MR/MS, however there is a dramatic increase in PAP that has not aligned with valve fx.   referred to pulm   D-dimer 296. PAP down to 44 mm July 2024.  Pt reports occ wheezing, given Flovent.  SVT -will get 2 wk zio today. cont metoprolol   Frequent SVT while on Toprol 200/day.  Admitted to [facility], changed to AAmio 200 mg.  Will have pt f/u with Dr Reyna.  visual sxs -cont rheum and optho f/u, no concern for orthostatic bp after monitor   Aortic Stenosis: s/p TAVR 2017. No SOB with ADL's. Still STAPLETON after several blocks. Likely orthopedic issues which limit her. TTE Oct 2020 hyperdynamic LV, LVH, with cavity obliteration, resting gradient 14 mmHg, increases to 46mmHg with valsalva. No issues with TAVR.  Now on high dose BBs  Mitral Stenosis- c/o SOB/STAPLETON, MVA 1.2 cm2 with PAP of 49 mm Hg, Already on Toprol 50 mg BID. Pt to see Dr Worley. Valve is degenerative not a candidate for MV procedure. She also got a 2nd opinion @ [facility], told the same thing.  - INCREASE Toprol to 50mg BID, somewhat better. Will increase further when she returns from her trip. Now on 100 BID.  Adding 25 mg more.  Dizziness: event monitor, unrevealing. No further work up at this time. EST did not show anything (-) either. s/p fall off of a step ladder, + SDH. Back on ASA. Fell in bathtub.  Chronic STAPLETON- multifactorial, orthopedic issues, deconditioning, age, MR/MS, elevated PAPs. Has IHSS Physiology, continue Toprol 100 BID 4/5/24 -likely multifactorial -will repeat echo   Radiculopathy- Gabapentin given  s/p thyroid surgery- Benign Hurthle cell tumor.  Flu shot HD given 9/17/18,11/6/19, Oct 2020 elsewhere. Had Covid Vaccines  Prediabetes- diet reviewed, labs drawn @ Rheum. Cr 1.46 HLD- Zocor increased to 40  Anxiety- trial of Buspar 3/21/22.

## 2025-06-19 NOTE — REASON FOR VISIT
[FreeTextEntry1] : 88 year old female who is a former smoker with a history of HTN, hyperlipidemia, hypothyroidism, GERD/peptic ulcer disease (NSAID-induced) and chronic diastolic heart failure with aortic stenosis s/p transfemoral TAVR with Yonatan S3 (23mm) on 17. Had follow up with Dr. Worley 17, doing well since surgery, complaining of dizziness. Awaiting event monitor, ordered by Dr. Worley. Labs were normal (CBC, coags, BMP, BNP). EKG normal. Reports it has been improving. Carotid us 2017 without evidence of hemodynamically significant stenosis of either carotid system.  Bothered by hives after starting Plavix, changed to effient .  Hives persist.  It may have been bed bugs.  s/p TAVR, and now doing better.  Stamina is better, mild STAPLETON.  PAP is 54mm Hg post TAVR. also has a lot of orthopedic issues. Had epidural 2018 at [facility], still c/o radicular pain left leg EKG: NSR, normal axis and intervals, no ST-Tw abnormalities. 19 Mult c/o 18: Dizziness since end of .  Worse with getting out of bed, room spinning. 18 repeat echo with Dr MORA was good.  Here c/o pain over temples, will check ESR 19 Fell at home Dec 21st 2018.  + N/V, went to [facility] ER  2 days later, + SDH. Sent to Dr Marcano on 18.  MRI done.  Also needs to have a right TKR in near future.  3/1/19- for 3/26/19 with Dr Schwartz 19  s/p right TKR, multiple breast bxs, B/L lumpectomies on Aug 19th, no CA.  Prediabetic 20 Sister  in , diverticulitis and ? CA.  Chronic STAPLETON.  She also reports a "pulling: at her left shoulder.  No recurrence.  Walks without CP.  Fell in Oct striking face and left eyebrow.  Residual nodule which is painful. 3/8/21 Torn ligamen left wrist(Dr Rand), Right eyebrow pain where she feel on it, bone feels abnormal.  Gets pain when sleeping on right face,.. Has phlegm in throat.  Concerned about the thyroid 21 s/p thyroid surgery complicated by hemorrhage.  Torn ligament right foot.  Glucose 139, Mildly elevated Lipids.  Had the Covid Vaccines 10/28/21 had B/L shoulder pain then progressive pain and weakness.  Finally saw Rheum, Dr Jewel Corona and Alejandra Crisostomo. Dx with PMR and placed on steroids. Pt reports being hyper on steroids and BP is up. 01/10/22: here for BP check. Reports high readings at home (>160). Worsening headaches when BP high. 3/21/22 BP labile, more high than low. Getting anxious/panic attacks. 22 STAPLETON, echo today with MS and PAP of 49 mm Hg 10/12/22 On increased Toprol 50 BID.  Going to [place] Oct 21 to 2022. 23 Has chronic back pain.  Plans a trip to [place] Aug 2023. On Pred for PMR. To see Dr Rehana Hall, saw the NP.. On Pred 2 mg.  .  c/o HA 23 labile BPs, had to go to the ER. 25 bothered by ocular migraines and HAs.  BP at home 200 systolic and 170 at orthopedics.  23   Going to [place] for 1 mo. 23 Did not have a good visit, very stressful.  Returned 23  Had a bad HA, BPs were up.  Also has sciatica. 24 having high BPs at home -up to 200/110. feeling well overall. no changes in recent lifestyle. went to rheumatologist 4 days ago and had a low BP -took valsartan 2x that morning. down to 1mg daily of prednisone. pain is well controlled.  24 having visual symptoms. +floaters. followed closely by Dr Barbosa and Dr Hall who have been treating her with prednisone. reports SOB with exertion that is chronic. BP has been stable since taking valsartan TID  24 s/p admission for SVT -aborted with adenosine x2. TTE (24): EF 68%, mod dilated LA; TAVR in place with mild AR, Mod MR / MS, Mod-severe TR. describes feeling of "electricity" going throughout her body. had previously had it in her knee -feels it as an impulse. currently on 8mg of prednisone d/t floaters. weaning slowly. can walk a half a block and has to stop.  24 Sees Rheum, Optho and NeuroOphtho: seeing red spots, they got bigger, saw William Barbosa and Alon.  2nd opinion at [facility]. Referred to vascular. Saw Dr Saenz and carotid OK.   She notices facial erythema.  Denies ETOH. In [facility] for SVT.    Had a sleep study. 25 Has seen multiple MDs.  Spilled coffee on herself. 25 bp has been severely elevated. having severe headaches. bp now low. current regimen is metoprolol 125mg in am and 100mg in pm, valsartan 80mg BID. amlodipine 2.5mg / amlodipine was added by the ED. She is checking her bp frequently at home   25 here for elevated bp. checking bp at home and frequently in the 190s. headaches are often. mentions feeling like she has electricity racing through her body that she is concerned is related to anxiety. 25 Bout of SVT, EMS CV her in her apt without sedation then took her to the [facility] ER. Started on Amio 2000 daily and Toprol reduced to 25 mg.  Zocor DCed, will start  Atorvastatin 20 EKG -NSR, PVC, IVCD

## 2025-06-19 NOTE — HISTORY OF PRESENT ILLNESS
[FreeTextEntry1] : Retina-. Dr. Dave.    Knee- Dr Schwartz Hand- Polatsch TAVR- Dr Worley  Pain- Dr Saldaña @ [facility] Breast- Adin Taylor Tongsen PCP- Norbert Fish Derm- Dr Rand Onc- Dr Boykin Rheum- Dr Jewel [address] Endo- Pastora VALLE- Maribell  EKG: NSR, normal axis and intervals, no ST-Tw abnormalities. 1/25/19, 12/16/20

## 2025-07-14 NOTE — HISTORY OF PRESENT ILLNESS
[FreeTextEntry1] : Ms. Durham is a pleasant 91 year-old female with a past medical history significant for HTN, HLD, Severe AS s/p TAVR (8/2017, Dr. 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  She reports she was at home on 5/31/24 having a snack.  She looked down and noted abrupt onset of palpitations and nausea.  She called EMS and was reportedly found to be in an SVT in the 180 bpm range.  She was treated with Adenosine 6 mg and 12 mg with conversion to sinus rhythm (performed at her home).  No strips available for review.  She was found to be in NSR in [facility] ED.  Admission notable for low TSH 0.211 and high Free T4 2.030.  Levothyroxine was reduced.  She underwent ambulatory telemetry 6/6 - 6/20/24 significant for SR (43-65-95), non sustained SVT (longest 14 beats).  She had been maintained on Toprol  mg BID prior to this hospitalization.   Reports in early June she had recurrent symptomatic SVT requiring DCCV in the field by EMS.  Notes that it happened again while in the ED at [facility] but terminated with medications.  She was started on Amiodarone for arrhythmia suppression.    Of note, she reports she intermittently sees "red spots" in bilateral visual fields.  Work up with neuro ophthalmology has been unrevealing thus far.  This pre dates Amiodarone use.

## 2025-07-14 NOTE — CARDIOLOGY SUMMARY
[de-identified] : 7/14/25 SR with rate variation 7/8/24 SR @ 65 bpm, rate variation [de-identified] : John 6/12 - 6/19/25 SR (51-), 32 non sustained SVT episodes (longest 17 beats with  bpm) [de-identified] :  TTE (6/01/24): EF 68%, mod dilated LA; TAVR in place with mild AR, Mod MR / MS, Mod-severe TR.

## 2025-07-14 NOTE — ADDENDUM
[FreeTextEntry1] : I, Gemini Call, am scribing for and the presence of Dr. Reyna the following sections: HPI, PMH,Family/social history, ROS, Physical Exam, Assessment / Plan.   I, Alfonso Reyna, personally performed the services described in the documentation, reviewed the documentation recorded by the scribe in my presence and it accurately and completely records my words and actions.

## 2025-07-14 NOTE — HISTORY OF PRESENT ILLNESS
[FreeTextEntry1] : Ms. Durham is a pleasant 91 year-old female with a past medical history significant for HTN, HLD, Severe AS s/p TAVR (8/2017, Dr. 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  She reports she was at home on 5/31/24 having a snack.  She looked down and noted abrupt onset of palpitations and nausea.  She called EMS and was reportedly found to be in an SVT in the 180 bpm range.  She was treated with Adenosine 6 mg and 12 mg with conversion to sinus rhythm (performed at her home).  No strips available for review.  She was found to be in NSR in [facility] ED.  Admission notable for low TSH 0.211 and high Free T4 2.030.  Levothyroxine was reduced.  She underwent ambulatory telemetry 6/6 - 6/20/24 significant for SR (43-65-95), non sustained SVT (longest 14 beats).  She had been maintained on Toprol  mg BID prior to this hospitalization.   Reports in early June she had recurrent symptomatic SVT requiring DCCV in the field by EMS.  Notes that it happened again while in the ED at [facility] but terminated with medications.  She was started on Amiodarone for arrhythmia suppression.    Of note, she reports she intermittently sees "red spots" in bilateral visual fields.  Work up with neuro ophthalmology has been unrevealing thus far.  This pre dates Amiodarone use.

## 2025-07-14 NOTE — CARDIOLOGY SUMMARY
[de-identified] : 7/14/25 SR with rate variation 7/8/24 SR @ 65 bpm, rate variation [de-identified] : John 6/12 - 6/19/25 SR (51-), 32 non sustained SVT episodes (longest 17 beats with  bpm) [de-identified] :  TTE (6/01/24): EF 68%, mod dilated LA; TAVR in place with mild AR, Mod MR / MS, Mod-severe TR.

## 2025-07-14 NOTE — DISCUSSION/SUMMARY
[FreeTextEntry1] : Ms. Durham is a pleasant 90 year-old female with a past medical history significant for HTN, HLD, Severe AS s/p TAVR (8/2017, Dr. Worley), Hypothyroidism 2/2 total thyroidectomy due to Hurtle cells ([facility], 5/2021), left ECA stenosis, Polymyalgia rheumatica (on Prednisone), GERD, sciatica, spondylolisthesis and SVT who presents for follow up   1.  Paroxysmal SVT  Recurrent symptomatic SVT requiring DCCV recently.    We discussed the normal conduction system of the heart and the various types of SVT. Options for management were discussed, including observation, vagal maneuvers, medical therapy and catheter based ablation. We discussed risks of ablation including, but not limited to, infection, vascular injury, cardiac perforation, injury to intrinsic conduction system necessitating PPM placement or need for emergent surgery.  After consideration of this information, the decision was made to proceed with EP study +/- ablation.  Advised to D/C Amiodarone at this time and hold Metoprolol three days prior to the procedure.   Ms. Durham appeared to understand the whole discussion and verbalized that all his questions were answered to his satisfaction. He was given pre procedure instructions and knows to call with any questions or concerns.

## 2025-07-14 NOTE — HISTORY OF PRESENT ILLNESS
[FreeTextEntry1] : Ms. Durham is a pleasant 91 year-old female with a past medical history significant for HTN, HLD, Severe AS s/p TAVR (8/2017, Dr. 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  She reports she was at home on 5/31/24 having a snack.  She looked down and noted abrupt onset of palpitations and nausea.  She called EMS and was reportedly found to be in an SVT in the 180 bpm range.  She was treated with Adenosine 6 mg and 12 mg with conversion to sinus rhythm (performed at her home).  No strips available for review.  She was found to be in NSR in [facility] ED.  Admission notable for low TSH 0.211 and high Free T4 2.030.  Levothyroxine was reduced.  She underwent ambulatory telemetry 6/6 - 6/20/24 significant for SR (43-65-95), non sustained SVT (longest 14 beats).  She had been maintained on Toprol  mg BID prior to this hospitalization.   Reports in early June she had recurrent symptomatic SVT requiring DCCV in the field by EMS.  Notes that it happened again while in the ED at [facility] but terminated with medications.  She was started on Amiodarone for arrhythmia suppression.    Of note, she reports she intermittently sees "red spots" in bilateral visual fields.  Work up with neuro ophthalmology has been unrevealing thus far.  This pre dates Amiodarone use. Speaking Coherently

## 2025-07-24 NOTE — ASSESSMENT
[FreeTextEntry1] : 91-year-old woman returns for follow up of PMR. Patient with visit longstanding polymyalgia rheumatica, cotninues with prednisone on daily basis.  Previous trials of tapering, may have been associated with increase in red spots in vision, following closely with neuro ophthalmologist, unclear etiology thought possibly ocular migraines, but feels symptoms may increase with further tapering of prednisone.  Patient with recent admission to [facility] for SVT, status post cardioversion both DCCV as well as amiodarone, reports red spots in visual field has not recurred since that time.  Patient was recently evaluated by EP specialist, will have ablation August 6.  Patient has since stopped amiodarone, continues metoprolol for now but will hold prior to procedure as per EP.  Will continue prednisone 5 mg for now, will likely start to taper following procedure.  Patient will follow-up in 2 months or sooner as needed.

## 2025-07-24 NOTE — REVIEW OF SYSTEMS
[Palpitations] : palpitations [Negative] : Heme/Lymph [FreeTextEntry3] : has not recurred since June hospitalization [FreeTextEntry5] : not since hospitalization

## 2025-07-24 NOTE — PHYSICAL EXAM
[General Appearance - Alert] : alert [General Appearance - In No Acute Distress] : in no acute distress [General Appearance - Well Nourished] : well nourished [General Appearance - Well Developed] : well developed [General Appearance - Well-Appearing] : healthy appearing [Sclera] : the sclera and conjunctiva were normal [Respiration, Rhythm And Depth] : normal respiratory rhythm and effort [Exaggerated Use Of Accessory Muscles For Inspiration] : no accessory muscle use [Auscultation Breath Sounds / Voice Sounds] : lungs were clear to auscultation bilaterally [Heart Rate And Rhythm] : heart rate was normal and rhythm regular [Heart Sounds] : normal S1 and S2 [Edema] : there was no peripheral edema [No Spinal Tenderness] : no spinal tenderness [Musculoskeletal - Swelling] : no joint swelling seen [] : no rash [Oriented To Time, Place, And Person] : oriented to person, place, and time [Impaired Insight] : insight and judgment were intact [Affect] : the affect was normal [Mood] : the mood was normal [FreeTextEntry1] : ambulates with cane